# Patient Record
Sex: MALE | Race: BLACK OR AFRICAN AMERICAN | NOT HISPANIC OR LATINO
[De-identification: names, ages, dates, MRNs, and addresses within clinical notes are randomized per-mention and may not be internally consistent; named-entity substitution may affect disease eponyms.]

---

## 2017-12-13 PROBLEM — Z00.00 ENCOUNTER FOR PREVENTIVE HEALTH EXAMINATION: Status: ACTIVE | Noted: 2017-12-13

## 2018-01-10 ENCOUNTER — APPOINTMENT (OUTPATIENT)
Dept: UROLOGY | Facility: CLINIC | Age: 63
End: 2018-01-10
Payer: COMMERCIAL

## 2018-01-10 VITALS
BODY MASS INDEX: 40.98 KG/M2 | TEMPERATURE: 98 F | DIASTOLIC BLOOD PRESSURE: 90 MMHG | WEIGHT: 255 LBS | OXYGEN SATURATION: 96 % | HEART RATE: 74 BPM | HEIGHT: 66 IN | RESPIRATION RATE: 17 BRPM | SYSTOLIC BLOOD PRESSURE: 134 MMHG

## 2018-01-10 PROCEDURE — 99203 OFFICE O/P NEW LOW 30 MIN: CPT

## 2018-01-12 LAB
APPEARANCE: CLEAR
BACTERIA: NEGATIVE
BILIRUBIN URINE: NEGATIVE
BLOOD URINE: NEGATIVE
COLOR: YELLOW
GLUCOSE QUALITATIVE U: NEGATIVE MG/DL
HYALINE CASTS: 0 /LPF
KETONES URINE: NEGATIVE
LEUKOCYTE ESTERASE URINE: NEGATIVE
MICROSCOPIC-UA: NORMAL
NITRITE URINE: NEGATIVE
PH URINE: 5.5
PROTEIN URINE: 30 MG/DL
PSA SERPL-MCNC: 8.07 NG/ML
RED BLOOD CELLS URINE: 0 /HPF
SPECIFIC GRAVITY URINE: 1.02
SQUAMOUS EPITHELIAL CELLS: 0 /HPF
URINE COMMENTS: NORMAL
UROBILINOGEN URINE: NEGATIVE MG/DL
WHITE BLOOD CELLS URINE: 1 /HPF

## 2018-01-23 ENCOUNTER — MESSAGE (OUTPATIENT)
Age: 63
End: 2018-01-23

## 2018-01-26 ENCOUNTER — APPOINTMENT (OUTPATIENT)
Dept: UROLOGY | Facility: CLINIC | Age: 63
End: 2018-01-26
Payer: COMMERCIAL

## 2018-01-26 VITALS
SYSTOLIC BLOOD PRESSURE: 148 MMHG | TEMPERATURE: 98.3 F | RESPIRATION RATE: 16 BRPM | WEIGHT: 255 LBS | HEART RATE: 76 BPM | BODY MASS INDEX: 41.16 KG/M2 | DIASTOLIC BLOOD PRESSURE: 96 MMHG

## 2018-01-26 PROCEDURE — 76942 ECHO GUIDE FOR BIOPSY: CPT | Mod: 59

## 2018-01-26 PROCEDURE — 55700: CPT

## 2018-01-26 PROCEDURE — 76872 US TRANSRECTAL: CPT

## 2018-01-27 ENCOUNTER — INPATIENT (INPATIENT)
Facility: HOSPITAL | Age: 63
LOS: 3 days | Discharge: HOME HEALTH | End: 2018-01-31
Attending: UROLOGY | Admitting: UROLOGY
Payer: COMMERCIAL

## 2018-01-27 VITALS
DIASTOLIC BLOOD PRESSURE: 79 MMHG | TEMPERATURE: 99 F | RESPIRATION RATE: 20 BRPM | OXYGEN SATURATION: 97 % | SYSTOLIC BLOOD PRESSURE: 127 MMHG | HEART RATE: 141 BPM

## 2018-01-27 LAB
ALBUMIN SERPL ELPH-MCNC: 3.7 G/DL — SIGNIFICANT CHANGE UP (ref 3.3–5)
ALP SERPL-CCNC: 57 U/L — SIGNIFICANT CHANGE UP (ref 40–120)
ALT FLD-CCNC: 66 U/L — HIGH (ref 4–41)
AST SERPL-CCNC: 119 U/L — HIGH (ref 4–40)
B PERT DNA SPEC QL NAA+PROBE: SIGNIFICANT CHANGE UP
BASE EXCESS BLDV CALC-SCNC: -1.4 MMOL/L — SIGNIFICANT CHANGE UP
BASOPHILS # BLD AUTO: 0.01 K/UL — SIGNIFICANT CHANGE UP (ref 0–0.2)
BASOPHILS NFR BLD AUTO: 0.1 % — SIGNIFICANT CHANGE UP (ref 0–2)
BILIRUB SERPL-MCNC: 1 MG/DL — SIGNIFICANT CHANGE UP (ref 0.2–1.2)
BLOOD GAS VENOUS - CREATININE: 1.72 MG/DL — HIGH (ref 0.5–1.3)
BUN SERPL-MCNC: 17 MG/DL — SIGNIFICANT CHANGE UP (ref 7–23)
C PNEUM DNA SPEC QL NAA+PROBE: NOT DETECTED — SIGNIFICANT CHANGE UP
CALCIUM SERPL-MCNC: 9.3 MG/DL — SIGNIFICANT CHANGE UP (ref 8.4–10.5)
CHLORIDE BLDV-SCNC: 104 MMOL/L — SIGNIFICANT CHANGE UP (ref 96–108)
CHLORIDE SERPL-SCNC: 100 MMOL/L — SIGNIFICANT CHANGE UP (ref 98–107)
CO2 SERPL-SCNC: 20 MMOL/L — LOW (ref 22–31)
CREAT SERPL-MCNC: 1.85 MG/DL — HIGH (ref 0.5–1.3)
EOSINOPHIL # BLD AUTO: 0.02 K/UL — SIGNIFICANT CHANGE UP (ref 0–0.5)
EOSINOPHIL NFR BLD AUTO: 0.3 % — SIGNIFICANT CHANGE UP (ref 0–6)
FLUAV H1 2009 PAND RNA SPEC QL NAA+PROBE: NOT DETECTED — SIGNIFICANT CHANGE UP
FLUAV H1 RNA SPEC QL NAA+PROBE: NOT DETECTED — SIGNIFICANT CHANGE UP
FLUAV H3 RNA SPEC QL NAA+PROBE: NOT DETECTED — SIGNIFICANT CHANGE UP
FLUAV SUBTYP SPEC NAA+PROBE: SIGNIFICANT CHANGE UP
FLUBV RNA SPEC QL NAA+PROBE: NOT DETECTED — SIGNIFICANT CHANGE UP
GAS PNL BLDV: 134 MMOL/L — LOW (ref 136–146)
GLUCOSE BLDV-MCNC: 233 — HIGH (ref 70–99)
GLUCOSE SERPL-MCNC: 224 MG/DL — HIGH (ref 70–99)
HADV DNA SPEC QL NAA+PROBE: NOT DETECTED — SIGNIFICANT CHANGE UP
HCO3 BLDV-SCNC: 23 MMOL/L — SIGNIFICANT CHANGE UP (ref 20–27)
HCOV 229E RNA SPEC QL NAA+PROBE: NOT DETECTED — SIGNIFICANT CHANGE UP
HCOV HKU1 RNA SPEC QL NAA+PROBE: NOT DETECTED — SIGNIFICANT CHANGE UP
HCOV NL63 RNA SPEC QL NAA+PROBE: NOT DETECTED — SIGNIFICANT CHANGE UP
HCOV OC43 RNA SPEC QL NAA+PROBE: NOT DETECTED — SIGNIFICANT CHANGE UP
HCT VFR BLD CALC: 40.5 % — SIGNIFICANT CHANGE UP (ref 39–50)
HCT VFR BLDV CALC: 41.7 % — SIGNIFICANT CHANGE UP (ref 39–51)
HGB BLD-MCNC: 13.6 G/DL — SIGNIFICANT CHANGE UP (ref 13–17)
HGB BLDV-MCNC: 13.6 G/DL — SIGNIFICANT CHANGE UP (ref 13–17)
HMPV RNA SPEC QL NAA+PROBE: NOT DETECTED — SIGNIFICANT CHANGE UP
HPIV1 RNA SPEC QL NAA+PROBE: NOT DETECTED — SIGNIFICANT CHANGE UP
HPIV2 RNA SPEC QL NAA+PROBE: NOT DETECTED — SIGNIFICANT CHANGE UP
HPIV3 RNA SPEC QL NAA+PROBE: NOT DETECTED — SIGNIFICANT CHANGE UP
HPIV4 RNA SPEC QL NAA+PROBE: NOT DETECTED — SIGNIFICANT CHANGE UP
IMM GRANULOCYTES # BLD AUTO: 0.04 # — SIGNIFICANT CHANGE UP
IMM GRANULOCYTES NFR BLD AUTO: 0.6 % — SIGNIFICANT CHANGE UP (ref 0–1.5)
LACTATE BLDV-MCNC: 5.5 MMOL/L — CRITICAL HIGH (ref 0.5–2)
LYMPHOCYTES # BLD AUTO: 0.31 K/UL — LOW (ref 1–3.3)
LYMPHOCYTES # BLD AUTO: 4.3 % — LOW (ref 13–44)
M PNEUMO DNA SPEC QL NAA+PROBE: NOT DETECTED — SIGNIFICANT CHANGE UP
MCHC RBC-ENTMCNC: 29.7 PG — SIGNIFICANT CHANGE UP (ref 27–34)
MCHC RBC-ENTMCNC: 33.6 % — SIGNIFICANT CHANGE UP (ref 32–36)
MCV RBC AUTO: 88.4 FL — SIGNIFICANT CHANGE UP (ref 80–100)
MONOCYTES # BLD AUTO: 0.03 K/UL — SIGNIFICANT CHANGE UP (ref 0–0.9)
MONOCYTES NFR BLD AUTO: 0.4 % — LOW (ref 2–14)
NEUTROPHILS # BLD AUTO: 6.74 K/UL — SIGNIFICANT CHANGE UP (ref 1.8–7.4)
NEUTROPHILS NFR BLD AUTO: 94.3 % — HIGH (ref 43–77)
NRBC # FLD: 0 — SIGNIFICANT CHANGE UP
PCO2 BLDV: 34 MMHG — LOW (ref 41–51)
PH BLDV: 7.44 PH — HIGH (ref 7.32–7.43)
PLATELET # BLD AUTO: 152 K/UL — SIGNIFICANT CHANGE UP (ref 150–400)
PMV BLD: 11.7 FL — SIGNIFICANT CHANGE UP (ref 7–13)
PO2 BLDV: 54 MMHG — HIGH (ref 35–40)
POTASSIUM BLDV-SCNC: 4.2 MMOL/L — SIGNIFICANT CHANGE UP (ref 3.4–4.5)
POTASSIUM SERPL-MCNC: 4.1 MMOL/L — SIGNIFICANT CHANGE UP (ref 3.5–5.3)
POTASSIUM SERPL-SCNC: 4.1 MMOL/L — SIGNIFICANT CHANGE UP (ref 3.5–5.3)
PROT SERPL-MCNC: 6.8 G/DL — SIGNIFICANT CHANGE UP (ref 6–8.3)
RBC # BLD: 4.58 M/UL — SIGNIFICANT CHANGE UP (ref 4.2–5.8)
RBC # FLD: 13.1 % — SIGNIFICANT CHANGE UP (ref 10.3–14.5)
RSV RNA SPEC QL NAA+PROBE: POSITIVE — HIGH
RV+EV RNA SPEC QL NAA+PROBE: NOT DETECTED — SIGNIFICANT CHANGE UP
SAO2 % BLDV: 87.5 % — HIGH (ref 60–85)
SODIUM SERPL-SCNC: 140 MMOL/L — SIGNIFICANT CHANGE UP (ref 135–145)
WBC # BLD: 7.15 K/UL — SIGNIFICANT CHANGE UP (ref 3.8–10.5)
WBC # FLD AUTO: 7.15 K/UL — SIGNIFICANT CHANGE UP (ref 3.8–10.5)

## 2018-01-27 PROCEDURE — 71046 X-RAY EXAM CHEST 2 VIEWS: CPT | Mod: 26

## 2018-01-27 RX ORDER — ACETAMINOPHEN 500 MG
650 TABLET ORAL ONCE
Qty: 0 | Refills: 0 | Status: COMPLETED | OUTPATIENT
Start: 2018-01-27 | End: 2018-01-27

## 2018-01-27 RX ORDER — PIPERACILLIN AND TAZOBACTAM 4; .5 G/20ML; G/20ML
3.38 INJECTION, POWDER, LYOPHILIZED, FOR SOLUTION INTRAVENOUS ONCE
Qty: 0 | Refills: 0 | Status: COMPLETED | OUTPATIENT
Start: 2018-01-27 | End: 2018-01-27

## 2018-01-27 RX ORDER — SODIUM CHLORIDE 9 MG/ML
1000 INJECTION INTRAMUSCULAR; INTRAVENOUS; SUBCUTANEOUS ONCE
Qty: 0 | Refills: 0 | Status: COMPLETED | OUTPATIENT
Start: 2018-01-27 | End: 2018-01-27

## 2018-01-27 RX ADMIN — PIPERACILLIN AND TAZOBACTAM 200 GRAM(S): 4; .5 INJECTION, POWDER, LYOPHILIZED, FOR SOLUTION INTRAVENOUS at 21:36

## 2018-01-27 RX ADMIN — SODIUM CHLORIDE 1000 MILLILITER(S): 9 INJECTION INTRAMUSCULAR; INTRAVENOUS; SUBCUTANEOUS at 21:32

## 2018-01-27 RX ADMIN — Medication 650 MILLIGRAM(S): at 22:22

## 2018-01-27 RX ADMIN — SODIUM CHLORIDE 1000 MILLILITER(S): 9 INJECTION INTRAMUSCULAR; INTRAVENOUS; SUBCUTANEOUS at 23:24

## 2018-01-27 NOTE — ED PROVIDER NOTE - OBJECTIVE STATEMENT
This patient is a 62y male w PMHx HTN, DM, GERD, p/w weakness, chills, dysuria, and hematuria s/p prostate biopsy which was performed yesterday. This bx was done due to high PSA levels, first taken due to chronic worsening back pain. The pt denies urinary retention or incontinence. He states that since the bx he began experiencing increased frequency of urination and dysuria, and since this morning he has felt weak and had the sensation of chills and nausea. Denies vomiting or diarrhea, denies abdominal or suprapubic pain. No CP or SOB.

## 2018-01-27 NOTE — ED ADULT NURSE NOTE - OBJECTIVE STATEMENT
alert oriented x 4 no distress  c/o fever maliase  hematuria  weakness and back pain   temp 102.5 rectally  seen by Dr Varela

## 2018-01-27 NOTE — ED ADULT TRIAGE NOTE - CHIEF COMPLAINT QUOTE
S/P prostate Bx. done yest. at MD office coming with fever this .0 F + Nausea + Back pain and hematuria started this PM.

## 2018-01-27 NOTE — ED PROVIDER NOTE - CARE PLAN
Principal Discharge DX:	Prostatitis, unspecified prostatitis type  Secondary Diagnosis:	UTI (urinary tract infection)

## 2018-01-27 NOTE — ED PROVIDER NOTE - ATTENDING CONTRIBUTION TO CARE
62m, s/p prostate bx yesterday. today, began to have dysuria, f/c, hematuria. has had a cough but no other uri or viral symptoms. no rash, neck pain. exam, febrile, tachy, other vs wnl, nad. hs and lungs normal, abdo benign, no flank tenderness. likely post bx prostatitis. will do labs, iv abx, uro consult.

## 2018-01-28 DIAGNOSIS — N41.9 INFLAMMATORY DISEASE OF PROSTATE, UNSPECIFIED: ICD-10-CM

## 2018-01-28 LAB
APPEARANCE UR: SIGNIFICANT CHANGE UP
BASE EXCESS BLDV CALC-SCNC: -3.7 MMOL/L — SIGNIFICANT CHANGE UP
BASOPHILS NFR SPEC: 0 % — SIGNIFICANT CHANGE UP (ref 0–2)
BILIRUB UR-MCNC: SIGNIFICANT CHANGE UP
BLOOD GAS VENOUS - CREATININE: 1.66 MG/DL — HIGH (ref 0.5–1.3)
BLOOD UR QL VISUAL: HIGH
BUN SERPL-MCNC: 22 MG/DL — SIGNIFICANT CHANGE UP (ref 7–23)
BUN SERPL-MCNC: 24 MG/DL — HIGH (ref 7–23)
CALCIUM SERPL-MCNC: 8.3 MG/DL — LOW (ref 8.4–10.5)
CALCIUM SERPL-MCNC: 8.7 MG/DL — SIGNIFICANT CHANGE UP (ref 8.4–10.5)
CHLORIDE BLDV-SCNC: 108 MMOL/L — SIGNIFICANT CHANGE UP (ref 96–108)
CHLORIDE SERPL-SCNC: 102 MMOL/L — SIGNIFICANT CHANGE UP (ref 98–107)
CHLORIDE SERPL-SCNC: 102 MMOL/L — SIGNIFICANT CHANGE UP (ref 98–107)
CO2 SERPL-SCNC: 23 MMOL/L — SIGNIFICANT CHANGE UP (ref 22–31)
CO2 SERPL-SCNC: 24 MMOL/L — SIGNIFICANT CHANGE UP (ref 22–31)
COLOR SPEC: HIGH
CREAT SERPL-MCNC: 1.59 MG/DL — HIGH (ref 0.5–1.3)
CREAT SERPL-MCNC: 1.65 MG/DL — HIGH (ref 0.5–1.3)
EOSINOPHIL NFR FLD: 0 % — SIGNIFICANT CHANGE UP (ref 0–6)
GAS PNL BLDV: 138 MMOL/L — SIGNIFICANT CHANGE UP (ref 136–146)
GLUCOSE BLDC GLUCOMTR-MCNC: 101 MG/DL — HIGH (ref 70–99)
GLUCOSE BLDC GLUCOMTR-MCNC: 111 MG/DL — HIGH (ref 70–99)
GLUCOSE BLDC GLUCOMTR-MCNC: 125 MG/DL — HIGH (ref 70–99)
GLUCOSE BLDC GLUCOMTR-MCNC: 132 MG/DL — HIGH (ref 70–99)
GLUCOSE BLDV-MCNC: 152 — HIGH (ref 70–99)
GLUCOSE SERPL-MCNC: 116 MG/DL — HIGH (ref 70–99)
GLUCOSE SERPL-MCNC: 155 MG/DL — HIGH (ref 70–99)
GLUCOSE UR-MCNC: 50 — HIGH
HCO3 BLDV-SCNC: 20 MMOL/L — SIGNIFICANT CHANGE UP (ref 20–27)
HCT VFR BLD CALC: 31.7 % — LOW (ref 39–50)
HCT VFR BLD CALC: 37 % — LOW (ref 39–50)
HCT VFR BLDV CALC: 37.7 % — LOW (ref 39–51)
HGB BLD-MCNC: 10.5 G/DL — LOW (ref 13–17)
HGB BLD-MCNC: 12.4 G/DL — LOW (ref 13–17)
HGB BLDV-MCNC: 12.3 G/DL — LOW (ref 13–17)
KETONES UR-MCNC: SIGNIFICANT CHANGE UP
LACTATE BLDV-MCNC: 4.5 MMOL/L — CRITICAL HIGH (ref 0.5–2)
LACTATE SERPL-SCNC: 2.7 MMOL/L — HIGH (ref 0.5–2)
LACTATE SERPL-SCNC: 3.8 MMOL/L — HIGH (ref 0.5–2)
LACTATE SERPL-SCNC: 4.4 MMOL/L — CRITICAL HIGH (ref 0.5–2)
LEUKOCYTE ESTERASE UR-ACNC: SIGNIFICANT CHANGE UP
LYMPHOCYTES NFR SPEC AUTO: 3 % — LOW (ref 13–44)
MAGNESIUM SERPL-MCNC: 1.2 MG/DL — LOW (ref 1.6–2.6)
MAGNESIUM SERPL-MCNC: 2.9 MG/DL — HIGH (ref 1.6–2.6)
MANUAL SMEAR VERIFICATION: SIGNIFICANT CHANGE UP
MCHC RBC-ENTMCNC: 30.2 PG — SIGNIFICANT CHANGE UP (ref 27–34)
MCHC RBC-ENTMCNC: 30.3 PG — SIGNIFICANT CHANGE UP (ref 27–34)
MCHC RBC-ENTMCNC: 33.1 % — SIGNIFICANT CHANGE UP (ref 32–36)
MCHC RBC-ENTMCNC: 33.5 % — SIGNIFICANT CHANGE UP (ref 32–36)
MCV RBC AUTO: 90.2 FL — SIGNIFICANT CHANGE UP (ref 80–100)
MCV RBC AUTO: 91.6 FL — SIGNIFICANT CHANGE UP (ref 80–100)
METAMYELOCYTES # FLD: 5 % — HIGH (ref 0–1)
METHOD TYPE: SIGNIFICANT CHANGE UP
MONOCYTES NFR BLD: 1 % — LOW (ref 2–9)
MORPHOLOGY BLD-IMP: NORMAL — SIGNIFICANT CHANGE UP
MUCOUS THREADS # UR AUTO: SIGNIFICANT CHANGE UP
MYELOCYTES NFR BLD: 8 % — HIGH (ref 0–0)
NEUTROPHIL AB SER-ACNC: 71 % — SIGNIFICANT CHANGE UP (ref 43–77)
NEUTS BAND # BLD: 12 % — HIGH (ref 0–6)
NITRITE UR-MCNC: NEGATIVE — SIGNIFICANT CHANGE UP
NRBC # BLD: 0 /100WBC — SIGNIFICANT CHANGE UP
NRBC # FLD: 0 — SIGNIFICANT CHANGE UP
NRBC # FLD: 0 — SIGNIFICANT CHANGE UP
ORGANISM # SPEC MICROSCOPIC CNT: SIGNIFICANT CHANGE UP
ORGANISM # SPEC MICROSCOPIC CNT: SIGNIFICANT CHANGE UP
PCO2 BLDV: 44 MMHG — SIGNIFICANT CHANGE UP (ref 41–51)
PH BLDV: 7.31 PH — LOW (ref 7.32–7.43)
PH UR: 6 — SIGNIFICANT CHANGE UP (ref 4.6–8)
PHOSPHATE SERPL-MCNC: 3.2 MG/DL — SIGNIFICANT CHANGE UP (ref 2.5–4.5)
PHOSPHATE SERPL-MCNC: 3.3 MG/DL — SIGNIFICANT CHANGE UP (ref 2.5–4.5)
PLATELET # BLD AUTO: 118 K/UL — LOW (ref 150–400)
PLATELET # BLD AUTO: 125 K/UL — LOW (ref 150–400)
PLATELET COUNT - ESTIMATE: NORMAL — SIGNIFICANT CHANGE UP
PMV BLD: 11.2 FL — SIGNIFICANT CHANGE UP (ref 7–13)
PMV BLD: 12.1 FL — SIGNIFICANT CHANGE UP (ref 7–13)
PO2 BLDV: 39 MMHG — SIGNIFICANT CHANGE UP (ref 35–40)
POTASSIUM BLDV-SCNC: 4.1 MMOL/L — SIGNIFICANT CHANGE UP (ref 3.4–4.5)
POTASSIUM SERPL-MCNC: 4.9 MMOL/L — SIGNIFICANT CHANGE UP (ref 3.5–5.3)
POTASSIUM SERPL-MCNC: 5.2 MMOL/L — SIGNIFICANT CHANGE UP (ref 3.5–5.3)
POTASSIUM SERPL-SCNC: 4.9 MMOL/L — SIGNIFICANT CHANGE UP (ref 3.5–5.3)
POTASSIUM SERPL-SCNC: 5.2 MMOL/L — SIGNIFICANT CHANGE UP (ref 3.5–5.3)
PROT UR-MCNC: 300 MG/DL — SIGNIFICANT CHANGE UP
RBC # BLD: 3.46 M/UL — LOW (ref 4.2–5.8)
RBC # BLD: 4.1 M/UL — LOW (ref 4.2–5.8)
RBC # FLD: 13.3 % — SIGNIFICANT CHANGE UP (ref 10.3–14.5)
RBC # FLD: 13.4 % — SIGNIFICANT CHANGE UP (ref 10.3–14.5)
RBC CASTS # UR COMP ASSIST: SIGNIFICANT CHANGE UP (ref 0–?)
SAO2 % BLDV: 67.6 % — SIGNIFICANT CHANGE UP (ref 60–85)
SODIUM SERPL-SCNC: 138 MMOL/L — SIGNIFICANT CHANGE UP (ref 135–145)
SODIUM SERPL-SCNC: 139 MMOL/L — SIGNIFICANT CHANGE UP (ref 135–145)
SP GR SPEC: 1.03 — SIGNIFICANT CHANGE UP (ref 1–1.04)
SPECIMEN SOURCE: SIGNIFICANT CHANGE UP
SPECIMEN SOURCE: SIGNIFICANT CHANGE UP
SQUAMOUS # UR AUTO: SIGNIFICANT CHANGE UP
UROBILINOGEN FLD QL: 2 MG/DL — HIGH
WBC # BLD: 15.06 K/UL — HIGH (ref 3.8–10.5)
WBC # BLD: 15.33 K/UL — HIGH (ref 3.8–10.5)
WBC # FLD AUTO: 15.06 K/UL — HIGH (ref 3.8–10.5)
WBC # FLD AUTO: 15.33 K/UL — HIGH (ref 3.8–10.5)
WBC CLUMPS #/AREA URNS HPF: PRESENT — HIGH (ref 0–?)
WBC UR QL: SIGNIFICANT CHANGE UP (ref 0–?)
YEAST BUDDING # UR COMP ASSIST: SIGNIFICANT CHANGE UP

## 2018-01-28 PROCEDURE — 99222 1ST HOSP IP/OBS MODERATE 55: CPT

## 2018-01-28 RX ORDER — ACETAMINOPHEN 500 MG
650 TABLET ORAL EVERY 6 HOURS
Qty: 0 | Refills: 0 | Status: DISCONTINUED | OUTPATIENT
Start: 2018-01-28 | End: 2018-01-31

## 2018-01-28 RX ORDER — CEFTRIAXONE 500 MG/1
1 INJECTION, POWDER, FOR SOLUTION INTRAMUSCULAR; INTRAVENOUS EVERY 24 HOURS
Qty: 0 | Refills: 0 | Status: DISCONTINUED | OUTPATIENT
Start: 2018-01-29 | End: 2018-01-29

## 2018-01-28 RX ORDER — INSULIN LISPRO 100/ML
VIAL (ML) SUBCUTANEOUS
Qty: 0 | Refills: 0 | Status: DISCONTINUED | OUTPATIENT
Start: 2018-01-28 | End: 2018-01-31

## 2018-01-28 RX ORDER — SODIUM CHLORIDE 9 MG/ML
1000 INJECTION, SOLUTION INTRAVENOUS
Qty: 0 | Refills: 0 | Status: DISCONTINUED | OUTPATIENT
Start: 2018-01-28 | End: 2018-01-28

## 2018-01-28 RX ORDER — MAGNESIUM SULFATE 500 MG/ML
2 VIAL (ML) INJECTION
Qty: 0 | Refills: 0 | Status: COMPLETED | OUTPATIENT
Start: 2018-01-28 | End: 2018-01-28

## 2018-01-28 RX ORDER — SODIUM CHLORIDE 9 MG/ML
1000 INJECTION INTRAMUSCULAR; INTRAVENOUS; SUBCUTANEOUS ONCE
Qty: 0 | Refills: 0 | Status: DISCONTINUED | OUTPATIENT
Start: 2018-01-28 | End: 2018-01-28

## 2018-01-28 RX ORDER — FLUCONAZOLE 150 MG/1
200 TABLET ORAL EVERY 24 HOURS
Qty: 0 | Refills: 0 | Status: DISCONTINUED | OUTPATIENT
Start: 2018-01-29 | End: 2018-01-30

## 2018-01-28 RX ORDER — FLUCONAZOLE 150 MG/1
200 TABLET ORAL ONCE
Qty: 0 | Refills: 0 | Status: COMPLETED | OUTPATIENT
Start: 2018-01-28 | End: 2018-01-28

## 2018-01-28 RX ORDER — CEFTRIAXONE 500 MG/1
1 INJECTION, POWDER, FOR SOLUTION INTRAMUSCULAR; INTRAVENOUS ONCE
Qty: 0 | Refills: 0 | Status: COMPLETED | OUTPATIENT
Start: 2018-01-28 | End: 2018-01-28

## 2018-01-28 RX ORDER — INSULIN LISPRO 100/ML
VIAL (ML) SUBCUTANEOUS AT BEDTIME
Qty: 0 | Refills: 0 | Status: DISCONTINUED | OUTPATIENT
Start: 2018-01-28 | End: 2018-01-31

## 2018-01-28 RX ORDER — HEPARIN SODIUM 5000 [USP'U]/ML
5000 INJECTION INTRAVENOUS; SUBCUTANEOUS EVERY 8 HOURS
Qty: 0 | Refills: 0 | Status: DISCONTINUED | OUTPATIENT
Start: 2018-01-28 | End: 2018-01-31

## 2018-01-28 RX ORDER — SODIUM CHLORIDE 9 MG/ML
1000 INJECTION INTRAMUSCULAR; INTRAVENOUS; SUBCUTANEOUS
Qty: 0 | Refills: 0 | Status: DISCONTINUED | OUTPATIENT
Start: 2018-01-28 | End: 2018-01-29

## 2018-01-28 RX ORDER — FAMOTIDINE 10 MG/ML
20 INJECTION INTRAVENOUS DAILY
Qty: 0 | Refills: 0 | Status: DISCONTINUED | OUTPATIENT
Start: 2018-01-28 | End: 2018-01-31

## 2018-01-28 RX ORDER — SODIUM CHLORIDE 9 MG/ML
1000 INJECTION INTRAMUSCULAR; INTRAVENOUS; SUBCUTANEOUS ONCE
Qty: 0 | Refills: 0 | Status: COMPLETED | OUTPATIENT
Start: 2018-01-28 | End: 2018-01-28

## 2018-01-28 RX ADMIN — Medication 50 GRAM(S): at 16:06

## 2018-01-28 RX ADMIN — Medication 650 MILLIGRAM(S): at 11:20

## 2018-01-28 RX ADMIN — HEPARIN SODIUM 5000 UNIT(S): 5000 INJECTION INTRAVENOUS; SUBCUTANEOUS at 15:05

## 2018-01-28 RX ADMIN — SODIUM CHLORIDE 150 MILLILITER(S): 9 INJECTION, SOLUTION INTRAVENOUS at 10:22

## 2018-01-28 RX ADMIN — HEPARIN SODIUM 5000 UNIT(S): 5000 INJECTION INTRAVENOUS; SUBCUTANEOUS at 21:55

## 2018-01-28 RX ADMIN — SODIUM CHLORIDE 150 MILLILITER(S): 9 INJECTION, SOLUTION INTRAVENOUS at 11:54

## 2018-01-28 RX ADMIN — FAMOTIDINE 20 MILLIGRAM(S): 10 INJECTION INTRAVENOUS at 11:54

## 2018-01-28 RX ADMIN — FLUCONAZOLE 100 MILLIGRAM(S): 150 TABLET ORAL at 02:20

## 2018-01-28 RX ADMIN — SODIUM CHLORIDE 1000 MILLILITER(S): 9 INJECTION INTRAMUSCULAR; INTRAVENOUS; SUBCUTANEOUS at 00:39

## 2018-01-28 RX ADMIN — Medication 50 GRAM(S): at 15:05

## 2018-01-28 RX ADMIN — Medication 30 MILLILITER(S): at 10:21

## 2018-01-28 RX ADMIN — CEFTRIAXONE 100 GRAM(S): 500 INJECTION, POWDER, FOR SOLUTION INTRAMUSCULAR; INTRAVENOUS at 05:02

## 2018-01-28 RX ADMIN — SODIUM CHLORIDE 150 MILLILITER(S): 9 INJECTION INTRAMUSCULAR; INTRAVENOUS; SUBCUTANEOUS at 15:05

## 2018-01-28 RX ADMIN — Medication 50 GRAM(S): at 17:27

## 2018-01-28 RX ADMIN — SODIUM CHLORIDE 150 MILLILITER(S): 9 INJECTION, SOLUTION INTRAVENOUS at 05:03

## 2018-01-28 RX ADMIN — Medication 650 MILLIGRAM(S): at 10:21

## 2018-01-28 RX ADMIN — Medication 30 MILLILITER(S): at 22:01

## 2018-01-28 RX ADMIN — SODIUM CHLORIDE 150 MILLILITER(S): 9 INJECTION, SOLUTION INTRAVENOUS at 07:19

## 2018-01-28 RX ADMIN — HEPARIN SODIUM 5000 UNIT(S): 5000 INJECTION INTRAVENOUS; SUBCUTANEOUS at 07:17

## 2018-01-28 NOTE — PROVIDER CONTACT NOTE (CRITICAL VALUE NOTIFICATION) - ACTION/TREATMENT ORDERED:
No action required, no intervention at this time. Will continue to assess & monitor.
No intervention ordered. Will continue to assess & monitor.
No intervention at this time. Will continue to assess & monitor.

## 2018-01-28 NOTE — H&P ADULT - NSHPPHYSICALEXAM_GEN_ALL_CORE
Physical Exam  Vital signs  T(C): 37.3 (01-28-18 @ 00:31), Max: 39.2 (01-27-18 @ 21:09)  HR: 110 (01-28-18 @ 00:31)  BP: 88/54 (01-28-18 @ 00:31)  SpO2: 96% (01-28-18 @ 00:31)    UOP: not recorded    Gen: diaphoretic, chills  [] NAD [] toxic    Pulm:  [X] no resp distress [X] no substernal retractions  	  CV:  [X] no JVD  [] RRR    GI: no SP fullness or tenderness, no CVAT b/l  [X] Soft [X] ND [X] NT    :  Glans Circumcised [X]Y  []N, []lesions:  Meatus Discharge []Y  [X] N,  Blood []Y [] N  Testes  Descended [X]Y  []N,    Tender []Y  [X]N,   Epididymis Tender  []Y [X]N,    Cremasteric []Y  []N                        	  MSK:  Edema []Y [X]N

## 2018-01-28 NOTE — H&P ADULT - ASSESSMENT
62M presents with fever s/p prostate biopsy on 1/26 with Dr. Vivas at outside hospital.  -Ceftriaxone and Diflucan (yeast on UA)   -Aggresive IV fluids   -Urine cx   -Blood cx  -Trend fever, monitor tachycardia and BP   -Home medications  -Discussed with Dr. Ramon

## 2018-01-28 NOTE — H&P ADULT - HISTORY OF PRESENT ILLNESS
HPI  62M presents with fever s/p prostate biopsy on 1/26 with Dr. Vivas at outside hospital. Patient reports taking amoxicillin pre-biopsy and received antibiotics on day of biopsy. Today he reported dysuria, weakness, nausea, chills and fever at home to 104.4. In the ED, patient was having difficulty urinating and post void residual was noted to 0 cc on bladder ultrasound bedside with collapsed bladder. Currently febrile to 102.5, hypotensive and tachycardic to 126. Lactate elevated to 5.5. Also endorses lower back pain at this time. Received one dose of Zosyn. Complaining of cough. No other URI symptoms. Respiratory viral panel positive for RSV.     PAST MEDICAL & SURGICAL HISTORY:  HTN (hypertension)  DM (diabetes mellitus)      MEDICATIONS  (STANDING):  cefTRIAXone   IVPB 1 Gram(s) IV Intermittent once      FAMILY HISTORY: none       Allergies: No Known Allergies      REVIEW OF SYSTEMS: Otherwise negative as stated in HPI

## 2018-01-28 NOTE — H&P ADULT - NSHPLABSRESULTS_GEN_ALL_CORE
LABS:                        13.6   7.15  )-----------( 152      ( 2018 21:05 )             40.5         140  |  100  |  17  ----------------------------<  224<H>  4.1   |  20<L>  |  1.85<H>    Ca    9.3      2018 21:05    TPro  6.8  /  Alb  3.7  /  TBili  1.0  /  DBili  x   /  AST  119<H>  /  ALT  66<H>  /  AlkPhos  57        Urinalysis Basic - ( 2018 00:30 )  Color: ZEN / Appearance: TURBID / S.035 / pH: 6.0  Gluc: 50 / Ketone: TRACE  / Bili: SMALL / Urobili: 2 mg/dL   Blood: LARGE / Protein: 300 mg/dL / Nitrite: NEGATIVE   Leuk Esterase: TRACE / RBC: 10-25 / WBC 10-25   Sq Epi: OCC / Non Sq Epi: x / Bacteria: x    Rapid Respiratory Viral Panel (18 @ 22:00): Resp Syncytial Virus (RapRVP): POSITIVE    Urine Cx: PENDING   Blood Cx: PENDING

## 2018-01-29 DIAGNOSIS — R97.20 ELEVATED PROSTATE SPECIFIC ANTIGEN [PSA]: ICD-10-CM

## 2018-01-29 DIAGNOSIS — R79.89 OTHER SPECIFIED ABNORMAL FINDINGS OF BLOOD CHEMISTRY: ICD-10-CM

## 2018-01-29 DIAGNOSIS — N17.9 ACUTE KIDNEY FAILURE, UNSPECIFIED: ICD-10-CM

## 2018-01-29 DIAGNOSIS — I10 ESSENTIAL (PRIMARY) HYPERTENSION: ICD-10-CM

## 2018-01-29 DIAGNOSIS — N41.9 INFLAMMATORY DISEASE OF PROSTATE, UNSPECIFIED: ICD-10-CM

## 2018-01-29 DIAGNOSIS — E11.9 TYPE 2 DIABETES MELLITUS WITHOUT COMPLICATIONS: ICD-10-CM

## 2018-01-29 DIAGNOSIS — K21.9 GASTRO-ESOPHAGEAL REFLUX DISEASE WITHOUT ESOPHAGITIS: ICD-10-CM

## 2018-01-29 LAB
ALBUMIN SERPL ELPH-MCNC: 3.4 G/DL — SIGNIFICANT CHANGE UP (ref 3.3–5)
ALBUMIN SERPL ELPH-MCNC: 3.7 G/DL — SIGNIFICANT CHANGE UP (ref 3.3–5)
ALP SERPL-CCNC: 77 U/L — SIGNIFICANT CHANGE UP (ref 40–120)
ALP SERPL-CCNC: 82 U/L — SIGNIFICANT CHANGE UP (ref 40–120)
ALT FLD-CCNC: 100 U/L — HIGH (ref 4–41)
ALT FLD-CCNC: 77 U/L — HIGH (ref 4–41)
AST SERPL-CCNC: 57 U/L — HIGH (ref 4–40)
AST SERPL-CCNC: 77 U/L — HIGH (ref 4–40)
BACTERIA UR CULT: SIGNIFICANT CHANGE UP
BILIRUB DIRECT SERPL-MCNC: 0.3 MG/DL — HIGH (ref 0.1–0.2)
BILIRUB SERPL-MCNC: 0.7 MG/DL — SIGNIFICANT CHANGE UP (ref 0.2–1.2)
BILIRUB SERPL-MCNC: 1 MG/DL — SIGNIFICANT CHANGE UP (ref 0.2–1.2)
BUN SERPL-MCNC: 17 MG/DL — SIGNIFICANT CHANGE UP (ref 7–23)
BUN SERPL-MCNC: 19 MG/DL — SIGNIFICANT CHANGE UP (ref 7–23)
CALCIUM SERPL-MCNC: 8.7 MG/DL — SIGNIFICANT CHANGE UP (ref 8.4–10.5)
CALCIUM SERPL-MCNC: 9 MG/DL — SIGNIFICANT CHANGE UP (ref 8.4–10.5)
CHLORIDE SERPL-SCNC: 103 MMOL/L — SIGNIFICANT CHANGE UP (ref 98–107)
CHLORIDE SERPL-SCNC: 105 MMOL/L — SIGNIFICANT CHANGE UP (ref 98–107)
CO2 SERPL-SCNC: 22 MMOL/L — SIGNIFICANT CHANGE UP (ref 22–31)
CO2 SERPL-SCNC: 25 MMOL/L — SIGNIFICANT CHANGE UP (ref 22–31)
CREAT SERPL-MCNC: 1.15 MG/DL — SIGNIFICANT CHANGE UP (ref 0.5–1.3)
CREAT SERPL-MCNC: 1.25 MG/DL — SIGNIFICANT CHANGE UP (ref 0.5–1.3)
GGT SERPL-CCNC: 146 U/L — HIGH (ref 8–61)
GLUCOSE BLDC GLUCOMTR-MCNC: 117 MG/DL — HIGH (ref 70–99)
GLUCOSE BLDC GLUCOMTR-MCNC: 121 MG/DL — HIGH (ref 70–99)
GLUCOSE BLDC GLUCOMTR-MCNC: 133 MG/DL — HIGH (ref 70–99)
GLUCOSE BLDC GLUCOMTR-MCNC: 149 MG/DL — HIGH (ref 70–99)
GLUCOSE SERPL-MCNC: 116 MG/DL — HIGH (ref 70–99)
GLUCOSE SERPL-MCNC: 148 MG/DL — HIGH (ref 70–99)
HBA1C BLD-MCNC: 7 % — HIGH (ref 4–5.6)
HCT VFR BLD CALC: 39.6 % — SIGNIFICANT CHANGE UP (ref 39–50)
HGB BLD-MCNC: 13.3 G/DL — SIGNIFICANT CHANGE UP (ref 13–17)
LDH SERPL L TO P-CCNC: 328 U/L — HIGH (ref 135–225)
MAGNESIUM SERPL-MCNC: 2.2 MG/DL — SIGNIFICANT CHANGE UP (ref 1.6–2.6)
MCHC RBC-ENTMCNC: 30 PG — SIGNIFICANT CHANGE UP (ref 27–34)
MCHC RBC-ENTMCNC: 33.6 % — SIGNIFICANT CHANGE UP (ref 32–36)
MCV RBC AUTO: 89.4 FL — SIGNIFICANT CHANGE UP (ref 80–100)
NRBC # FLD: 0 — SIGNIFICANT CHANGE UP
ORGANISM # SPEC MICROSCOPIC CNT: SIGNIFICANT CHANGE UP
PHOSPHATE SERPL-MCNC: 1.6 MG/DL — LOW (ref 2.5–4.5)
PLATELET # BLD AUTO: 114 K/UL — LOW (ref 150–400)
PMV BLD: 12.8 FL — SIGNIFICANT CHANGE UP (ref 7–13)
POTASSIUM SERPL-MCNC: 4.3 MMOL/L — SIGNIFICANT CHANGE UP (ref 3.5–5.3)
POTASSIUM SERPL-MCNC: 5.1 MMOL/L — SIGNIFICANT CHANGE UP (ref 3.5–5.3)
POTASSIUM SERPL-SCNC: 4.3 MMOL/L — SIGNIFICANT CHANGE UP (ref 3.5–5.3)
POTASSIUM SERPL-SCNC: 5.1 MMOL/L — SIGNIFICANT CHANGE UP (ref 3.5–5.3)
PROT SERPL-MCNC: 6.9 G/DL — SIGNIFICANT CHANGE UP (ref 6–8.3)
PROT SERPL-MCNC: 7.2 G/DL — SIGNIFICANT CHANGE UP (ref 6–8.3)
RBC # BLD: 4.43 M/UL — SIGNIFICANT CHANGE UP (ref 4.2–5.8)
RBC # FLD: 13.2 % — SIGNIFICANT CHANGE UP (ref 10.3–14.5)
SODIUM SERPL-SCNC: 140 MMOL/L — SIGNIFICANT CHANGE UP (ref 135–145)
SODIUM SERPL-SCNC: 142 MMOL/L — SIGNIFICANT CHANGE UP (ref 135–145)
SPECIMEN SOURCE: SIGNIFICANT CHANGE UP
WBC # BLD: 16.47 K/UL — HIGH (ref 3.8–10.5)
WBC # FLD AUTO: 16.47 K/UL — HIGH (ref 3.8–10.5)

## 2018-01-29 PROCEDURE — 99231 SBSQ HOSP IP/OBS SF/LOW 25: CPT

## 2018-01-29 PROCEDURE — 99223 1ST HOSP IP/OBS HIGH 75: CPT

## 2018-01-29 RX ORDER — PIOGLITAZONE HYDROCHLORIDE 15 MG/1
1 TABLET ORAL
Qty: 0 | Refills: 0 | COMMUNITY

## 2018-01-29 RX ORDER — METFORMIN HYDROCHLORIDE 850 MG/1
500 TABLET ORAL
Qty: 0 | Refills: 0 | COMMUNITY

## 2018-01-29 RX ORDER — QUINAPRIL HYDROCHLORIDE 40 MG/1
40 TABLET, FILM COATED ORAL
Qty: 0 | Refills: 0 | COMMUNITY

## 2018-01-29 RX ORDER — PIPERACILLIN AND TAZOBACTAM 4; .5 G/20ML; G/20ML
3.38 INJECTION, POWDER, LYOPHILIZED, FOR SOLUTION INTRAVENOUS EVERY 8 HOURS
Qty: 0 | Refills: 0 | Status: DISCONTINUED | OUTPATIENT
Start: 2018-01-29 | End: 2018-01-30

## 2018-01-29 RX ORDER — RANITIDINE HYDROCHLORIDE 150 MG/1
1 TABLET, FILM COATED ORAL
Qty: 0 | Refills: 0 | COMMUNITY

## 2018-01-29 RX ADMIN — HEPARIN SODIUM 5000 UNIT(S): 5000 INJECTION INTRAVENOUS; SUBCUTANEOUS at 06:42

## 2018-01-29 RX ADMIN — FLUCONAZOLE 100 MILLIGRAM(S): 150 TABLET ORAL at 02:37

## 2018-01-29 RX ADMIN — CEFTRIAXONE 100 GRAM(S): 500 INJECTION, POWDER, FOR SOLUTION INTRAMUSCULAR; INTRAVENOUS at 06:42

## 2018-01-29 RX ADMIN — HEPARIN SODIUM 5000 UNIT(S): 5000 INJECTION INTRAVENOUS; SUBCUTANEOUS at 21:51

## 2018-01-29 RX ADMIN — PIPERACILLIN AND TAZOBACTAM 25 GRAM(S): 4; .5 INJECTION, POWDER, LYOPHILIZED, FOR SOLUTION INTRAVENOUS at 21:51

## 2018-01-29 RX ADMIN — HEPARIN SODIUM 5000 UNIT(S): 5000 INJECTION INTRAVENOUS; SUBCUTANEOUS at 13:48

## 2018-01-29 RX ADMIN — FAMOTIDINE 20 MILLIGRAM(S): 10 INJECTION INTRAVENOUS at 12:34

## 2018-01-29 RX ADMIN — Medication 200 MILLIGRAM(S): at 21:51

## 2018-01-29 NOTE — CONSULT NOTE ADULT - PROBLEM SELECTOR PROBLEM 3
Type 2 diabetes mellitus without complication, without long-term current use of insulin Elevated LFTs

## 2018-01-29 NOTE — CONSULT NOTE ADULT - ASSESSMENT
62M DM, HTN, HLD, GERD, elevated PSA, s/p prostate biopsy on 1/26 with Dr. Vivas at outside hospital. Patient reports taking amoxicillin pre-biopsy and received antibiotics on day of biopsy. Day after biopsy developed dysuria, weakness, nausea, chills and fever at home to 104.4. Daughter also said he was disoriented. In ED febrile to 102.5, hypotensive and tachycardic to 126. Lactate elevated to 5.5. +RSV.  Being treated for prostatitis, found to have GNR bacteremia

## 2018-01-29 NOTE — CONSULT NOTE ADULT - PROBLEM SELECTOR RECOMMENDATION 9
sepsis present on admission - after prostate biopsy developed GNR bacteremia  - fluid resuscitated, abx, lactate decreasing  - consider broaden back to zosyn until sens back, repeat blood cx's for clearance  - also on fluconazole

## 2018-01-29 NOTE — CONSULT NOTE ADULT - SUBJECTIVE AND OBJECTIVE BOX
Patient is a 62y old  Male who presents with a chief complaint of Fever s/p prostate biopsy (2018 02:45)    HPI: 62M DM, HTN, HLD, GERD, elevated PSA, s/p prostate biopsy on  with Dr. Vivas at outside hospital. Patient reports taking amoxicillin pre-biopsy and received antibiotics on day of biopsy. Day after biopsy developed dysuria, weakness, nausea, chills and fever at home to 104.4. Daughter also said he was disoriented. In ED febrile to 102.5, hypotensive and tachycardic to 126. Lactate elevated to 5.5. +RSV.   Feeling better overall.  Mental status back to baseline.  No chest pain, SOB, nausea, vomiting, diarrhea, constipation. +dry cough and nasal congestion.     Allergies: No Known Allergies    HOME MEDICATIONS: [X ] Reviewed  quinapril 40qd  metformin 500 bid  simvastatin 20  ASA 81  ranitidine bid    MEDICATIONS  (STANDING):  cefTRIAXone   IVPB 1 Gram(s) IV Intermittent every 24 hours  famotidine    Tablet 20 milliGRAM(s) Oral daily  fluconAZOLE IVPB 200 milliGRAM(s) IV Intermittent every 24 hours  heparin  Injectable 5000 Unit(s) SubCutaneous every 8 hours  insulin lispro (HumaLOG) corrective regimen sliding scale   SubCutaneous three times a day before meals  insulin lispro (HumaLOG) corrective regimen sliding scale   SubCutaneous at bedtime    MEDICATIONS  (PRN):  acetaminophen   Tablet. 650 milliGRAM(s) Oral every 6 hours PRN Mild Pain (1 - 3) and or fever  aluminum hydroxide/magnesium hydroxide/simethicone Suspension 30 milliLiter(s) Oral every 6 hours PRN Dyspepsia  guaiFENesin    Syrup 200 milliGRAM(s) Oral every 6 hours PRN Cough    PAST MEDICAL & SURGICAL HISTORY:  HTN (hypertension)  DM (diabetes mellitus)  HLD  GERD    SOCIAL HISTORY:  , 2 children, , no tob/alcohol/drugs    FAMILY HISTORY:  mother  alzheimers; father  pancreatic cancer    REVIEW OF SYSTEMS:  CONSTITUTIONAL: per HPI  EYES: No eye pain, visual disturbances, or discharge  ENMT:  No difficulty hearing, tinnitus, vertigo; No sinus or throat pain; per HPI  NECK: No pain or stiffness  BREASTS: No pain, masses, or nipple discharge  RESPIRATORY: No cough, wheezing, chills or hemoptysis; No shortness of breath  CARDIOVASCULAR: No chest pain, palpitations, dizziness, or leg swelling  GASTROINTESTINAL: No abdominal or epigastric pain. No nausea, vomiting, or hematemesis; No diarrhea or constipation. No melena or hematochezia.  GENITOURINARY: per HPI  NEUROLOGICAL: No headaches, memory loss, loss of strength, numbness, or tremors  SKIN: No itching, burning, rashes, or lesions   LYMPH NODES: No enlarged glands  ENDOCRINE: No heat or cold intolerance; No hair loss  MUSCULOSKELETAL: No muscle or back pain  PSYCHIATRIC: No depression, anxiety, mood swings, or difficulty sleeping  HEME/LYMPH: No easy bruising, or bleeding gums  ALLERGY AND IMMUNOLOGIC: No hives or eczema    [ X ] All other ROS negative  [  ] Unable to obtain due to poor mental status    Vital Signs Last 24 Hrs  T(C): 36.7 (2018 13:45), Max: 37.4 (2018 06:40)  T(F): 98.1 (2018 13:45), Max: 99.4 (2018 06:40)  HR: 102 (2018 13:45) (89 - 102)  BP: 163/83 (2018 13:45) (100/60 - 164/80)  RR: 16 (2018 13:45) (15 - 16)  SpO2: 97% (2018 13:45) (95% - 99%)    PHYSICAL EXAM:  GENERAL: NAD, well-groomed, well-developed  HEAD:  Atraumatic, Normocephalic  EYES: EOMI, PERRLA, conjunctiva and sclera clear  ENMT: Moist mucous membranes  NECK: Supple, No JVD  RESPIRATORY: Clear to auscultation bilaterally; No rales, rhonchi, wheezing, or rubs  CARDIOVASCULAR: Regular rate and rhythm; No murmurs, rubs, or gallops  GASTROINTESTINAL: Soft, Nontender, Nondistended; Bowel sounds present  GENITOURINARY: Not examined  EXTREMITIES:  2+ Peripheral Pulses, No clubbing, cyanosis, or edema  NERVOUS SYSTEM:  Alert & Oriented X3; Moving all 4 extremities; No gross sensory deficits  HEME/LYMPH: No lymphadenopathy noted  SKIN: No rashes or lesions; Incisions C/D/I  PSYCH: calm    LABS:             13.3   16.47 )-----------( 114      ( 2018 05:12 )             39.6     140  |  103  |  19  ----------------------------<  116<H>  4.3   |  22  |  1.25    Ca    9.0      2018 05:12  Phos  3.2       Mg     2.9         TPro  6.8  /  Alb  3.7  /  TBili  1.0  /  DBili  x   /  AST  119<H>  /  ALT  66<H>  /  AlkPhos  57      Urinalysis Basic - ( 2018 00:30 )  Color: ZEN / Appearance: TURBID / S.035 / pH: 6.0  Gluc: 50 / Ketone: TRACE  / Bili: SMALL / Urobili: 2 mg/dL   Blood: LARGE / Protein: 300 mg/dL / Nitrite: NEGATIVE   Leuk Esterase: TRACE / RBC: 10-25 / WBC 10-25   Sq Epi: OCC / Non Sq Epi: x / Bacteria: x    CAPILLARY BLOOD GLUCOSE  POCT Blood Glucose.: 117 mg/dL (2018 12:25)    RADIOLOGY & ADDITIONAL STUDIES:    EKG:   Personally Reviewed:  [ ] YES     Imaging:   Personally Reviewed:  [ ] YES               Consultant(s) notes reviewed:    Care Discussed with Consultant(s)/Other Providers: urology re overall care

## 2018-01-30 DIAGNOSIS — B97.4 RESPIRATORY SYNCYTIAL VIRUS AS THE CAUSE OF DISEASES CLASSIFIED ELSEWHERE: ICD-10-CM

## 2018-01-30 LAB
-  AMIKACIN: SIGNIFICANT CHANGE UP
-  AMPICILLIN/SULBACTAM: SIGNIFICANT CHANGE UP
-  AMPICILLIN: SIGNIFICANT CHANGE UP
-  AZTREONAM: SIGNIFICANT CHANGE UP
-  CEFAZOLIN: SIGNIFICANT CHANGE UP
-  CEFEPIME: SIGNIFICANT CHANGE UP
-  CEFOXITIN: SIGNIFICANT CHANGE UP
-  CEFTAZIDIME: SIGNIFICANT CHANGE UP
-  CEFTRIAXONE: SIGNIFICANT CHANGE UP
-  CIPROFLOXACIN: SIGNIFICANT CHANGE UP
-  ERTAPENEM: SIGNIFICANT CHANGE UP
-  GENTAMICIN: SIGNIFICANT CHANGE UP
-  IMIPENEM: SIGNIFICANT CHANGE UP
-  LEVOFLOXACIN: SIGNIFICANT CHANGE UP
-  MEROPENEM: SIGNIFICANT CHANGE UP
-  PIPERACILLIN/TAZOBACTAM: SIGNIFICANT CHANGE UP
-  TIGECYCLINE: SIGNIFICANT CHANGE UP
-  TOBRAMYCIN: SIGNIFICANT CHANGE UP
-  TRIMETHOPRIM/SULFAMETHOXAZOLE: SIGNIFICANT CHANGE UP
BACTERIA BLD CULT: SIGNIFICANT CHANGE UP
BACTERIA BLD CULT: SIGNIFICANT CHANGE UP
E COLI DNA BLD POS QL NAA+NON-PROBE: SIGNIFICANT CHANGE UP
GLUCOSE BLDC GLUCOMTR-MCNC: 128 MG/DL — HIGH (ref 70–99)
GLUCOSE BLDC GLUCOMTR-MCNC: 136 MG/DL — HIGH (ref 70–99)
GLUCOSE BLDC GLUCOMTR-MCNC: 151 MG/DL — HIGH (ref 70–99)
GLUCOSE BLDC GLUCOMTR-MCNC: 152 MG/DL — HIGH (ref 70–99)
HCT VFR BLD CALC: 34.5 % — LOW (ref 39–50)
HGB BLD-MCNC: 11.5 G/DL — LOW (ref 13–17)
LACTATE SERPL-SCNC: 1.3 MMOL/L — SIGNIFICANT CHANGE UP (ref 0.5–2)
MCHC RBC-ENTMCNC: 29.3 PG — SIGNIFICANT CHANGE UP (ref 27–34)
MCHC RBC-ENTMCNC: 33.3 % — SIGNIFICANT CHANGE UP (ref 32–36)
MCV RBC AUTO: 88 FL — SIGNIFICANT CHANGE UP (ref 80–100)
METHOD TYPE: SIGNIFICANT CHANGE UP
NRBC # FLD: 0 — SIGNIFICANT CHANGE UP
PLATELET # BLD AUTO: 122 K/UL — LOW (ref 150–400)
PMV BLD: 12.9 FL — SIGNIFICANT CHANGE UP (ref 7–13)
RBC # BLD: 3.92 M/UL — LOW (ref 4.2–5.8)
RBC # FLD: 13.8 % — SIGNIFICANT CHANGE UP (ref 10.3–14.5)
SPECIMEN SOURCE: SIGNIFICANT CHANGE UP
SPECIMEN SOURCE: SIGNIFICANT CHANGE UP
WBC # BLD: 15.96 K/UL — HIGH (ref 3.8–10.5)
WBC # FLD AUTO: 15.96 K/UL — HIGH (ref 3.8–10.5)

## 2018-01-30 PROCEDURE — 99254 IP/OBS CNSLTJ NEW/EST MOD 60: CPT | Mod: GC

## 2018-01-30 PROCEDURE — 99231 SBSQ HOSP IP/OBS SF/LOW 25: CPT

## 2018-01-30 PROCEDURE — 99233 SBSQ HOSP IP/OBS HIGH 50: CPT

## 2018-01-30 RX ORDER — CEFTRIAXONE 500 MG/1
2 INJECTION, POWDER, FOR SOLUTION INTRAMUSCULAR; INTRAVENOUS EVERY 24 HOURS
Qty: 0 | Refills: 0 | Status: DISCONTINUED | OUTPATIENT
Start: 2018-01-30 | End: 2018-01-31

## 2018-01-30 RX ORDER — POLYETHYLENE GLYCOL 3350 17 G/17G
17 POWDER, FOR SOLUTION ORAL DAILY
Qty: 0 | Refills: 0 | Status: DISCONTINUED | OUTPATIENT
Start: 2018-01-30 | End: 2018-01-31

## 2018-01-30 RX ORDER — DOCUSATE SODIUM 100 MG
100 CAPSULE ORAL THREE TIMES A DAY
Qty: 0 | Refills: 0 | Status: DISCONTINUED | OUTPATIENT
Start: 2018-01-30 | End: 2018-01-31

## 2018-01-30 RX ORDER — PIPERACILLIN AND TAZOBACTAM 4; .5 G/20ML; G/20ML
3.38 INJECTION, POWDER, LYOPHILIZED, FOR SOLUTION INTRAVENOUS EVERY 8 HOURS
Qty: 0 | Refills: 0 | Status: DISCONTINUED | OUTPATIENT
Start: 2018-01-30 | End: 2018-01-30

## 2018-01-30 RX ORDER — SENNA PLUS 8.6 MG/1
2 TABLET ORAL AT BEDTIME
Qty: 0 | Refills: 0 | Status: DISCONTINUED | OUTPATIENT
Start: 2018-01-30 | End: 2018-01-31

## 2018-01-30 RX ADMIN — FAMOTIDINE 20 MILLIGRAM(S): 10 INJECTION INTRAVENOUS at 11:53

## 2018-01-30 RX ADMIN — PIPERACILLIN AND TAZOBACTAM 25 GRAM(S): 4; .5 INJECTION, POWDER, LYOPHILIZED, FOR SOLUTION INTRAVENOUS at 13:17

## 2018-01-30 RX ADMIN — HEPARIN SODIUM 5000 UNIT(S): 5000 INJECTION INTRAVENOUS; SUBCUTANEOUS at 21:33

## 2018-01-30 RX ADMIN — Medication 2: at 18:03

## 2018-01-30 RX ADMIN — PIPERACILLIN AND TAZOBACTAM 25 GRAM(S): 4; .5 INJECTION, POWDER, LYOPHILIZED, FOR SOLUTION INTRAVENOUS at 06:42

## 2018-01-30 RX ADMIN — HEPARIN SODIUM 5000 UNIT(S): 5000 INJECTION INTRAVENOUS; SUBCUTANEOUS at 06:42

## 2018-01-30 RX ADMIN — POLYETHYLENE GLYCOL 3350 17 GRAM(S): 17 POWDER, FOR SOLUTION ORAL at 18:29

## 2018-01-30 RX ADMIN — Medication 100 MILLIGRAM(S): at 21:33

## 2018-01-30 RX ADMIN — SENNA PLUS 2 TABLET(S): 8.6 TABLET ORAL at 21:32

## 2018-01-30 RX ADMIN — Medication 62.5 MILLIMOLE(S): at 04:36

## 2018-01-30 RX ADMIN — Medication 200 MILLIGRAM(S): at 11:53

## 2018-01-30 RX ADMIN — FLUCONAZOLE 100 MILLIGRAM(S): 150 TABLET ORAL at 01:53

## 2018-01-30 RX ADMIN — CEFTRIAXONE 100 GRAM(S): 500 INJECTION, POWDER, FOR SOLUTION INTRAMUSCULAR; INTRAVENOUS at 18:29

## 2018-01-30 RX ADMIN — HEPARIN SODIUM 5000 UNIT(S): 5000 INJECTION INTRAVENOUS; SUBCUTANEOUS at 13:16

## 2018-01-30 NOTE — PROGRESS NOTE ADULT - PROBLEM SELECTOR PLAN 1
f/u Cx results - sensitivities  cont zosyn / Diflucan  OOB  Continued Leukocytosis will d/w Attending need for Pelvic imaging

## 2018-01-30 NOTE — CONSULT NOTE ADULT - SUBJECTIVE AND OBJECTIVE BOX
Patient is a 62y old  Male who presents with a chief complaint of Fever s/p prostate biopsy (28 Jan 2018 02:45)    HPI  62M presents with fever s/p prostate biopsy on 1/26 with Dr. Vivas at outside hospital. Patient reports taking amoxicillin pre-biopsy and received antibiotics on day of biopsy. Today he reported dysuria, weakness, nausea, chills and fever at home to 104.4. In the ED, patient was having difficulty urinating and post void residual was noted to 0 cc on bladder ultrasound bedside with collapsed bladder. Currently febrile to 102.5, hypotensive and tachycardic to 126. Lactate elevated to 5.5. Also endorses lower back pain at this time. Received one dose of Zosyn. Complaining of cough. No other URI symptoms. Respiratory viral panel positive for RSV.     PAST MEDICAL & SURGICAL HISTORY:  HTN (hypertension)  DM (diabetes mellitus)      MEDICATIONS  (STANDING):  cefTRIAXone   IVPB 1 Gram(s) IV Intermittent once      FAMILY HISTORY: none       Allergies: No Known Allergies      Social history:  No smoking      FAMILY HISTORY:    Allergies    No Known Allergies    Intolerances        Antimicrobials:    fluconAZOLE IVPB 200 milliGRAM(s) IV Intermittent every 24 hours  piperacillin/tazobactam IVPB. 3.375 Gram(s) IV Intermittent every 8 hours        Vital Signs Last 24 Hrs  T(C): 37.2 (30 Jan 2018 05:00), Max: 37.7 (29 Jan 2018 17:40)  T(F): 98.9 (30 Jan 2018 05:00), Max: 99.9 (29 Jan 2018 17:40)  HR: 95 (30 Jan 2018 05:00) (67 - 102)  BP: 157/75 (30 Jan 2018 05:00) (125/56 - 163/83)  BP(mean): --  RR: 16 (30 Jan 2018 05:00) (16 - 17)  SpO2: 96% (30 Jan 2018 05:00) (96% - 97%)                              11.5   15.96 )-----------( 122      ( 30 Jan 2018 06:05 )             34.5         01-29    142  |  105  |  17  ----------------------------<  148<H>  5.1   |  25  |  1.15    Ca    8.7      29 Jan 2018 19:03  Phos  1.6     01-29  Mg     2.2     01-29    TPro  6.9  /  Alb  3.4  /  TBili  0.7  /  DBili  x   /  AST  57<H>  /  ALT  77<H>  /  AlkPhos  77  01-29      RECENT CULTURES:  01-29 @ 06:06  BLOOD PERIPHERAL  ngtd  NO ORGANISMS ISOLATED  NO ORGANISMS ISOLATED AT 24 HOURS    01-28 @ 00:49  URINE MIDSTREAM  NGTD      01-27 @ 22:31  BLOOD PERIPHERAL  BLOOD CULTURE PCR  Escherichia coli--Resistant to Levaquin/cipro  BLOOD CULTURE PCR  PCR      RADIOLOGY  Xray Chest 2 Views PA/Lat (01.27.18 @ 23:00) >  IMPRESSION:   Clear lungs. Patient is a 62y old  Male who presents with a chief complaint of Fever s/p prostate biopsy (28 Jan 2018 02:45)    HPI  62M presents with fever s/p prostate biopsy( for elevated PSA) on 1/26 with Dr. Vivas at outside hospital. Patient reports taking amoxicillin pre-biopsy and received antibiotics on day of biopsy. Today he reported dysuria, weakness, nausea, chills and fever at home to 104.4. In the ED, patient was having difficulty urinating and post void residual was noted to 0 cc on bladder ultrasound bedside with collapsed bladder. Currently febrile to 102.5, hypotensive and tachycardic to 126. Lactate elevated to 5.5. Also endorses lower back pain at this time. Received one dose of Zosyn. Complaining of cough. No other URI symptoms. Respiratory viral panel positive for RSV.     PAST MEDICAL & SURGICAL HISTORY:  HTN (hypertension)  DM (diabetes mellitus)      MEDICATIONS  (STANDING):  cefTRIAXone   IVPB 1 Gram(s) IV Intermittent once      FAMILY HISTORY: none       Allergies: No Known Allergies      Social history:  No smoking  Originally from [facility]      FAMILY HISTORY:  No Hx of Cancer    Allergies    No Known Allergies    Intolerances        Antimicrobials:    fluconAZOLE IVPB 200 milliGRAM(s) IV Intermittent every 24 hours  piperacillin/tazobactam IVPB. 3.375 Gram(s) IV Intermittent every 8 hours        Vital Signs Last 24 Hrs  T(C): 37.2 (30 Jan 2018 05:00), Max: 37.7 (29 Jan 2018 17:40)  T(F): 98.9 (30 Jan 2018 05:00), Max: 99.9 (29 Jan 2018 17:40)  HR: 95 (30 Jan 2018 05:00) (67 - 102)  BP: 157/75 (30 Jan 2018 05:00) (125/56 - 163/83)  BP(mean): --  RR: 16 (30 Jan 2018 05:00) (16 - 17)  SpO2: 96% (30 Jan 2018 05:00) (96% - 97%)                              11.5   15.96 )-----------( 122      ( 30 Jan 2018 06:05 )             34.5         01-29    142  |  105  |  17  ----------------------------<  148<H>  5.1   |  25  |  1.15    Ca    8.7      29 Jan 2018 19:03  Phos  1.6     01-29  Mg     2.2     01-29    TPro  6.9  /  Alb  3.4  /  TBili  0.7  /  DBili  x   /  AST  57<H>  /  ALT  77<H>  /  AlkPhos  77  01-29      RECENT CULTURES:  01-29 @ 06:06  BLOOD PERIPHERAL  ngtd  NO ORGANISMS ISOLATED  NO ORGANISMS ISOLATED AT 24 HOURS    01-28 @ 00:49  URINE MIDSTREAM  NGTD      01-27 @ 22:31  BLOOD PERIPHERAL  BLOOD CULTURE PCR  Escherichia coli--Resistant to Levaquin/cipro  BLOOD CULTURE PCR  PCR      RADIOLOGY  Xray Chest 2 Views PA/Lat (01.27.18 @ 23:00) >  IMPRESSION:   Clear lungs.      RVP--positive for RSV

## 2018-01-30 NOTE — PROGRESS NOTE ADULT - PROBLEM SELECTOR PLAN 1
sepsis present on admission, improving - after prostate biopsy developed GNR bacteremia  - fluid resuscitated, abx, lactate normalized  - Ecoli bacteremia resis quinolones --> appreciate ID input - f/u cx's to assure clearance, CTX 2g for 1 week, then PO  - also on fluconazole.

## 2018-01-30 NOTE — CONSULT NOTE ADULT - ATTENDING COMMENTS
d/w pt, daughter Elva at bedside
E coli sepsis following prostate biopsy.  improving.  RSV+    Suggest;  follow repeat blood cultures drawn yesterday to assure clearance of bacteremia                ceftriaxone 2 gm iv daily for at least 7 days followed by keflex po x 7 days.

## 2018-01-30 NOTE — CONSULT NOTE ADULT - ASSESSMENT
62M presents with  fever s/p prostate biopsy on 1/26 with Dr. Vivas at outside hospital. Found to have sepsis( fever, bandemia, positive blood culture) with E coli bacteremia in the setting of recent prostate bx. Urine cultures have been negative so far and repeat blood cultures also negative so far. Still with some leukocytosis    Recommend  --Fu blood cultures from 1/29  --Continue with Zosyn for now 62M presents with  fever s/p prostate biopsy on 1/26 with Dr. Vivas at outside hospital. Found to have sepsis( fever, bandemia, positive blood culture) with E coli bacteremia in the setting of recent prostate bx. Urine cultures have been negative so far and repeat blood cultures also negative so far. Still with some leukocytosis    Recommend  --Fu blood cultures from 1/29  --Continue with Zosyn for now  --Will plan 10-14 days of Iv abx  --Can get PICC when repeat cultures are negative at 48 hours ( 1/31/18) 62M presents with  fever s/p prostate biopsy on 1/26 with Dr. Vivas at outside hospital. Found to have sepsis( fever, bandemia, positive blood culture) with E coli bacteremia in the setting of recent prostate bx. Urine cultures have been negative so far and repeat blood cultures also negative so far. Still with some leukocytosis, has some non productive cough with RVP positive for RSV    Recommend  --Contact isolation for RSV  --Fu blood cultures from 1/29  --Continue with Zosyn for now  --Will plan 10-14 days of IV abx  --Can get PICC or midline when repeat cultures are negative at 48 hours ( 1/31/18) 62M presents with  fever s/p prostate biopsy on 1/26 with Dr. Vivas at outside hospital. Found to have sepsis( fever, bandemia, positive blood culture) with E coli bacteremia in the setting of recent prostate bx. Urine cultures have been negative so far and repeat blood cultures also negative so far. Still with some leukocytosis, has some non productive cough with RVP positive for RSV    Recommend  --Contact isolation for RSV  --Fu blood cultures from 1/29  --Switch Zosyn to Ceftriaxone 2g Iv/day  --Will switch to oral abx after at least 6-7 days of Iv abx 62M presents with  fever s/p prostate biopsy on 1/26 with Dr. Vivas at outside hospital. Found to have sepsis( fever, bandemia, positive blood culture) with E coli bacteremia in the setting of recent prostate bx. Urine cultures have been negative so far and repeat blood cultures also negative so far. Still with some leukocytosis, has some non productive cough with RVP positive for RSV    Recommend  --Contact isolation for RSV  --Fu blood cultures from 1/29  --Switch Zosyn to Ceftriaxone 2g Iv/day  --Will switch to oral abx after at least 6-7 days of Iv abx  --Dc fluconazole

## 2018-01-31 ENCOUNTER — TRANSCRIPTION ENCOUNTER (OUTPATIENT)
Age: 63
End: 2018-01-31

## 2018-01-31 VITALS
TEMPERATURE: 99 F | OXYGEN SATURATION: 98 % | DIASTOLIC BLOOD PRESSURE: 95 MMHG | SYSTOLIC BLOOD PRESSURE: 167 MMHG | RESPIRATION RATE: 20 BRPM | HEART RATE: 80 BPM

## 2018-01-31 LAB
GLUCOSE BLDC GLUCOMTR-MCNC: 128 MG/DL — HIGH (ref 70–99)
GLUCOSE BLDC GLUCOMTR-MCNC: 130 MG/DL — HIGH (ref 70–99)
GLUCOSE BLDC GLUCOMTR-MCNC: 131 MG/DL — HIGH (ref 70–99)

## 2018-01-31 PROCEDURE — 77001 FLUOROGUIDE FOR VEIN DEVICE: CPT | Mod: 26,GC

## 2018-01-31 PROCEDURE — 99232 SBSQ HOSP IP/OBS MODERATE 35: CPT

## 2018-01-31 PROCEDURE — 99233 SBSQ HOSP IP/OBS HIGH 50: CPT

## 2018-01-31 PROCEDURE — 76937 US GUIDE VASCULAR ACCESS: CPT | Mod: 26

## 2018-01-31 PROCEDURE — 99238 HOSP IP/OBS DSCHRG MGMT 30/<: CPT

## 2018-01-31 PROCEDURE — 36569 INSJ PICC 5 YR+ W/O IMAGING: CPT

## 2018-01-31 RX ORDER — CEFTRIAXONE 500 MG/1
2 INJECTION, POWDER, FOR SOLUTION INTRAMUSCULAR; INTRAVENOUS
Qty: 0 | Refills: 0 | COMMUNITY
Start: 2018-01-31

## 2018-01-31 RX ORDER — LISINOPRIL 2.5 MG/1
40 TABLET ORAL DAILY
Qty: 0 | Refills: 0 | Status: DISCONTINUED | OUTPATIENT
Start: 2018-01-31 | End: 2018-01-31

## 2018-01-31 RX ORDER — POLYETHYLENE GLYCOL 3350 17 G/17G
17 POWDER, FOR SOLUTION ORAL
Qty: 0 | Refills: 0 | COMMUNITY
Start: 2018-01-31

## 2018-01-31 RX ORDER — SENNA PLUS 8.6 MG/1
2 TABLET ORAL
Qty: 0 | Refills: 0 | COMMUNITY
Start: 2018-01-31

## 2018-01-31 RX ORDER — DOCUSATE SODIUM 100 MG
1 CAPSULE ORAL
Qty: 0 | Refills: 0 | COMMUNITY
Start: 2018-01-31

## 2018-01-31 RX ADMIN — LISINOPRIL 40 MILLIGRAM(S): 2.5 TABLET ORAL at 18:34

## 2018-01-31 RX ADMIN — Medication 200 MILLIGRAM(S): at 15:12

## 2018-01-31 RX ADMIN — Medication 100 MILLIGRAM(S): at 15:12

## 2018-01-31 RX ADMIN — HEPARIN SODIUM 5000 UNIT(S): 5000 INJECTION INTRAVENOUS; SUBCUTANEOUS at 15:11

## 2018-01-31 RX ADMIN — HEPARIN SODIUM 5000 UNIT(S): 5000 INJECTION INTRAVENOUS; SUBCUTANEOUS at 06:03

## 2018-01-31 RX ADMIN — CEFTRIAXONE 100 GRAM(S): 500 INJECTION, POWDER, FOR SOLUTION INTRAMUSCULAR; INTRAVENOUS at 17:41

## 2018-01-31 RX ADMIN — POLYETHYLENE GLYCOL 3350 17 GRAM(S): 17 POWDER, FOR SOLUTION ORAL at 12:17

## 2018-01-31 RX ADMIN — Medication 200 MILLIGRAM(S): at 06:03

## 2018-01-31 RX ADMIN — FAMOTIDINE 20 MILLIGRAM(S): 10 INJECTION INTRAVENOUS at 12:16

## 2018-01-31 RX ADMIN — Medication 100 MILLIGRAM(S): at 06:03

## 2018-01-31 NOTE — DISCHARGE NOTE ADULT - CARE PROVIDER_API CALL
Ignacio Ramon), Urology  [address] [place] [address]  Phone: [phone]  Fax: [phone]    Siria Duggan), Infectious Disease; Internal Medicine  [address] [place] [id number]  Phone: [phone]  Fax: [phone]

## 2018-01-31 NOTE — DISCHARGE NOTE ADULT - PLAN OF CARE
Recovery from Infection --Call the office if you have fever greater than 101, difficulty urinating, pain not relieved with pain medication, nausea/vomiting.  --Take your antibiotics for the full course.  --Follow Homecare directions on how to administer your antibiotics  --Call for follow up appointment with Dr. Duggan - Infectious Disease- in 1 week  [phone]  --Call for follow up with Dr. Ramon within the next 2 weeks  [phone]  --take stool softners as prescribed --Call the office if you have fever greater than 101, difficulty urinating, pain not relieved with pain medication, nausea/vomiting.  --Take your antibiotics for the full course.  --Follow [facility] directions on how to administer your antibiotics  --Call for follow up appointment with Dr. Duggan - Infectious Disease- in 1 week  [phone]  --Call for follow up with Dr. Ramon within the next 2 weeks  [phone]  --take stool softners as prescribed --Call the office if you have fever greater than 101, difficulty urinating, pain not relieved with pain medication, nausea/vomiting.  --Take your antibiotics for the full course.  --Follow [facility] directions on how to administer your antibiotics  --Call for follow up appointment with Dr. Duggan - Infectious Disease- in 2 weeks  [phone]  --Call for follow up with Dr. Ramon within the next 2 weeks  [phone]  --take stool softners as prescribed

## 2018-01-31 NOTE — DISCHARGE NOTE ADULT - PATIENT PORTAL LINK FT
“You can access the [facility] Patient Portal, offered by [facility], by registering with the following website: [email or web address]”

## 2018-01-31 NOTE — DISCHARGE NOTE ADULT - CARE PLAN
Principal Discharge DX:	Prostatitis, unspecified prostatitis type  Goal:	Recovery from Infection  Assessment and plan of treatment:	--Call the office if you have fever greater than 101, difficulty urinating, pain not relieved with pain medication, nausea/vomiting.  --Take your antibiotics for the full course.  --Follow Homecare directions on how to administer your antibiotics  --Call for follow up appointment with Dr. Duggna - Infectious Disease- in 1 week  [phone]  --Call for follow up with Dr. Ramon within the next 2 weeks  [phone]  --take stool softners as prescribed Principal Discharge DX:	Prostatitis, unspecified prostatitis type  Goal:	Recovery from Infection  Assessment and plan of treatment:	--Call the office if you have fever greater than 101, difficulty urinating, pain not relieved with pain medication, nausea/vomiting.  --Take your antibiotics for the full course.  --Follow [facility] directions on how to administer your antibiotics  --Call for follow up appointment with Dr. Duggan - Infectious Disease- in 1 week  [phone]  --Call for follow up with Dr. Ramon within the next 2 weeks  [phone]  --take stool softners as prescribed Principal Discharge DX:	Prostatitis, unspecified prostatitis type  Goal:	Recovery from Infection  Assessment and plan of treatment:	--Call the office if you have fever greater than 101, difficulty urinating, pain not relieved with pain medication, nausea/vomiting.  --Take your antibiotics for the full course.  --Follow [facility] directions on how to administer your antibiotics  --Call for follow up appointment with Dr. Duggan - Infectious Disease- in 2 weeks  [phone]  --Call for follow up with Dr. Ramon within the next 2 weeks  [phone]  --take stool softners as prescribed

## 2018-01-31 NOTE — DISCHARGE NOTE ADULT - INSTRUCTIONS
Slowly resume previous diet.  Initially avoid fatty, greasy foods and large meals Call MD for any c/o nausea, vomit, fever, pain not relieved after medicated for pain, and a return appointment.  Take over the counter stool softener to prevent constipation that can be a side effect from taking pain medication Call MD for any c/o nausea, vomit, fever, pain not relieved after medicated for pain, and a return appointment.  Cleanse your left arm around the PICC line with chlorhexedine soap.  VNS to change PICC dressing tomorrow 2/1/2017, PICC was inserted 1/31/18.   Take over the counter stool softener to prevent constipation that can be a side effect from taking pain medication

## 2018-01-31 NOTE — PROGRESS NOTE ADULT - PROBLEM SELECTOR PLAN 4
on admission likely d/t sepsis, statin held, improving
on admission likely d/t sepsis, statin held, improving

## 2018-01-31 NOTE — PROGRESS NOTE ADULT - ATTENDING COMMENTS
Patient seen and examined. UCx e.coli. sensitivities pending.  Likely discharge today with 2 week total course abx.  RSV to be addressed by hospitalist.
d/w pt, and wife at bedside
d/w pt, and wife at bedside

## 2018-01-31 NOTE — DISCHARGE NOTE ADULT - HOME CARE AGENCY
[facility] - [phone]  the above infusion provider will arrange delivery of infusion supplies to the patient's home and Infusion Nurse will see patient at home for start of care on 2/1/18.

## 2018-01-31 NOTE — PROGRESS NOTE ADULT - PROBLEM SELECTOR PROBLEM 1
Prostatitis, unspecified prostatitis type

## 2018-01-31 NOTE — DISCHARGE NOTE ADULT - MEDICATION SUMMARY - MEDICATIONS TO TAKE
I will START or STAY ON the medications listed below when I get home from the hospital:    quinapril  -- 40 milligram(s) by mouth once a day  -- Indication: For Home    pioglitazone 45 mg oral tablet  -- 1 tab(s) by mouth once a day  -- Indication: For Home    metFORMIN  -- 500 milligram(s) by mouth 2 times a day  -- Indication: For Home    glipiZIDE 10 mg oral tablet  -- 1 tab(s) by mouth 2 times a day  -- Indication: For Home    cefTRIAXone  -- 2 gram(s) intravenous once a day  -- Indication: For Antibiotics - IV from Home Care Agency until 2/10/18    guaiFENesin 100 mg/5 mL oral liquid  -- 10 milliliter(s) by mouth every 6 hours, As needed, Cough  -- Indication: For cough    raNITIdine 150 mg oral capsule  -- 1 cap(s) by mouth 2 times a day  -- Indication: For Home    senna oral tablet  -- 2 tab(s) by mouth once a day (at bedtime)  -- Indication: For constipation avoidance    polyethylene glycol 3350 oral powder for reconstitution  -- 17 gram(s) by mouth once a day  -- Indication: For constipation avoidance    docusate sodium 100 mg oral capsule  -- 1 cap(s) by mouth 3 times a day  -- Indication: For constipation avoidance

## 2018-01-31 NOTE — PROGRESS NOTE ADULT - PROBLEM SELECTOR PLAN 1
sepsis present on admission, improving - after prostate biopsy developed Ecoli bacteremia  - fluid resuscitated, abx, lactate normalized  - Ecoli bacteremia resis quinolones --> appreciate ID input - repeat cx's cleared, PICC d/c on IV CTX course  - also on fluconazole. sepsis present on admission, improving - after prostate biopsy developed Ecoli bacteremia  - fluid resuscitated, abx, lactate normalized  - Ecoli bacteremia resis quinolones --> appreciate ID input - repeat cx's cleared, PICC d/c on IV CTX course

## 2018-01-31 NOTE — DISCHARGE NOTE ADULT - ADDITIONAL INSTRUCTIONS
--Call the office if you have fever greater than 101, difficulty urinating, pain not relieved with pain medication, nausea/vomiting.  --Take your antibiotics for the full course.  --Follow Homecare directions on how to administer your antibiotics  --Call for follow up appointment with Dr. Duggan - Infectious Disease- in 1 week  [phone]  --Call for follow up with Dr. Ramon within the next 2 weeks  [phone]  --take stool softners as prescribed --Call the office if you have fever greater than 101, difficulty urinating, pain not relieved with pain medication, nausea/vomiting.  --Take your antibiotics for the full course.  --Follow [facility] directions on how to administer your antibiotics  --Call for follow up appointment with Dr. Duggan - Infectious Disease- in 1 week  [phone]  --Call for follow up with Dr. Ramon within the next 2 weeks  [phone]  --take stool softners as prescribed --Call the office if you have fever greater than 101, difficulty urinating, pain not relieved with pain medication, nausea/vomiting.  --Take your antibiotics for the full course.  --Follow [facility] directions on how to administer your antibiotics  --Call for follow up appointment with Dr. Duggan - Infectious Disease- in 2 weeks  [phone]  --Call for follow up with Dr. Ramon within the next 2 weeks  [phone]  --take stool softners as prescribed

## 2018-01-31 NOTE — PROGRESS NOTE ADULT - ASSESSMENT
E coli sepsis post prostate biopsy clinically improved.  Organism resistant to quinolones and bactrim.  RSV+    Would continue ceftriaxone through 2/10.  Orders for ceftriaxone and lab work discussed with [facility].
62M DM, HTN, HLD, GERD, elevated PSA, s/p prostate biopsy on 1/26 with Dr. Vivas at outside hospital. Patient reports taking amoxicillin pre-biopsy and received antibiotics on day of biopsy. Day after biopsy developed dysuria, weakness, nausea, chills and fever at home to 104.4. Daughter also said he was disoriented. In ED febrile to 102.5, hypotensive and tachycardic to 126. Lactate elevated to 5.5. +RSV.  Being treated for prostatitis, found to have Ecoli bacteremia
63 yo M Ecoli bacteremia s/p prostate bx 1/26 and + RSV, has remained afebrile, voiding well, for PICC today
Pt s/p prostate bx admitted with fever
Pt s/p prostate bx admitted with fever and Leukocytosis
62M DM, HTN, HLD, GERD, elevated PSA, s/p prostate biopsy on 1/26 with Dr. Vivas at outside hospital. Patient reports taking amoxicillin pre-biopsy and received antibiotics on day of biopsy. Day after biopsy developed dysuria, weakness, nausea, chills and fever at home to 104.4. Daughter also said he was disoriented. In ED febrile to 102.5, hypotensive and tachycardic to 126. Lactate elevated to 5.5. +RSV.  Being treated for prostatitis, found to have GNR bacteremia

## 2018-01-31 NOTE — DISCHARGE NOTE ADULT - HOSPITAL COURSE
Admitted on 1/28 for fevers and chills after prostate Biopsy performed on 1/26.  Pt had Blood and Urine Cx's that revealed E.Coli in his blood culture. He was treated with Zosyn, Diflucan, and Ceftriaxone.  Pt defervesced and was stable during the remainder of his admission.  ID consult done and agreed upon continued treatment with Ceftriaxone. Admitted on 1/28 for fevers and chills after prostate Biopsy performed on 1/26.  Pt had Blood and Urine Cx's that revealed E.Coli in his blood culture. He was treated with Zosyn, Diflucan, and Ceftriaxone.  Pt defervesced and was stable during the remainder of his admission.  ID consult done and agreed upon continued treatment with Ceftriaxone.  Pt had PICC line placed and will be treated via [facility] with Ceftriaxone until 2/10/18

## 2018-01-31 NOTE — PROGRESS NOTE ADULT - PROBLEM SELECTOR PLAN 1
Continue ceftriaxone x at least 7 days, then po keflex per ID  Diflucan d/c  OOB, DVT prophy  Discharge later today  Continued Leukocytosis will d/w Attending need for Pelvic imaging Continue ceftriaxone x at least 7 days, then po keflex per ID  Diflucan d/c  OOB, DVT prophy  Discharge later today with home care

## 2018-01-31 NOTE — PROGRESS NOTE ADULT - SUBJECTIVE AND OBJECTIVE BOX
Follow Up: E coli bacteremia post prostate biopsy     Inverval History/ROS: feels well.  no pain, fever, chills.  Rest of ROS neg    Allergies  No Known Allergies        ANTIMICROBIALS:  cefTRIAXone   IVPB 2 every 24 hours      OTHER MEDS:  acetaminophen   Tablet. 650 milliGRAM(s) Oral every 6 hours PRN  aluminum hydroxide/magnesium hydroxide/simethicone Suspension 30 milliLiter(s) Oral every 6 hours PRN  docusate sodium 100 milliGRAM(s) Oral three times a day  famotidine    Tablet 20 milliGRAM(s) Oral daily  guaiFENesin    Syrup 200 milliGRAM(s) Oral every 6 hours PRN  heparin  Injectable 5000 Unit(s) SubCutaneous every 8 hours  HYDROcodone/homatropine Syrup 5 milliLiter(s) Oral every 6 hours PRN  insulin lispro (HumaLOG) corrective regimen sliding scale   SubCutaneous three times a day before meals  insulin lispro (HumaLOG) corrective regimen sliding scale   SubCutaneous at bedtime  lisinopril 40 milliGRAM(s) Oral daily  polyethylene glycol 3350 17 Gram(s) Oral daily  senna 2 Tablet(s) Oral at bedtime      Vital Signs Last 24 Hrs  T(C): 37.1 (31 Jan 2018 15:22), Max: 37.6 (31 Jan 2018 06:00)  T(F): 98.7 (31 Jan 2018 15:22), Max: 99.6 (31 Jan 2018 06:00)  HR: 84 (31 Jan 2018 15:22) (78 - 94)  BP: 156/92 (31 Jan 2018 15:22) (145/89 - 161/82)  BP(mean): --  RR: 22 (31 Jan 2018 15:22) (16 - 22)  SpO2: 98% (31 Jan 2018 15:22) (95% - 98%)    PHYSICAL EXAM:  General: [x ] non-toxic  HEAD/EYES: [ ] PERRL [x ] white sclera [ ] icterus  ENT:  [ ] normal [x ] supple [ ] thrush [ ] pharyngeal exudate  Cardiovascular:   [ ] murmur [x ] normal [ ] PPM/AICD  Respiratory:  [x ] clear to ausculation bilaterally  GI:  x[ ] soft, non-tender, normal bowel sounds  :  [ ] liu [x ] no CVA tenderness   Musculoskeletal:  [ ] no synovitis  Neurologic:  [ ] non-focal exam   Skin:  [x ] no rash  Lymph: [ ] no lymphadenopathy  Psychiatric:  [ ] appropriate affect [ ] alert & oriented  Lines:  [ ] no phlebitis [ ] central line  PIC left arm                                11.5   15.96 )-----------( 122      ( 30 Jan 2018 06:05 )             34.5       01-29    142  |  105  |  17  ----------------------------<  148<H>  5.1   |  25  |  1.15    Ca    8.7      29 Jan 2018 19:03  Phos  1.6     01-29  Mg     2.2     01-29    TPro  6.9  /  Alb  3.4  /  TBili  0.7  /  DBili  x   /  AST  57<H>  /  ALT  77<H>  /  AlkPhos  77  01-29          MICROBIOLOGY:  v  BLOOD PERIPHERAL  01-29-18 --  --  --      URINE MIDSTREAM  01-28-18 --  --  --      BLOOD PERIPHERAL  01-27-18 --  --  BLOOD CULTURE PCR  Escherichia coli                RADIOLOGY:
Afeb 140/75  98  96%RA    Pt has no c/o    Abd- soft NT ND    Void 1500 cc
CC: ecoli bacteremia    SUBJECTIVE / OVERNIGHT EVENTS:  Able to sleep better after Hycodan.  No BM yet.  No pain, SOB, nausea, vomiting.    MEDICATIONS  (STANDING):  cefTRIAXone   IVPB 2 Gram(s) IV Intermittent every 24 hours  docusate sodium 100 milliGRAM(s) Oral three times a day  famotidine    Tablet 20 milliGRAM(s) Oral daily  heparin  Injectable 5000 Unit(s) SubCutaneous every 8 hours  insulin lispro (HumaLOG) corrective regimen sliding scale   SubCutaneous three times a day before meals  insulin lispro (HumaLOG) corrective regimen sliding scale   SubCutaneous at bedtime  lisinopril 40 milliGRAM(s) Oral daily  polyethylene glycol 3350 17 Gram(s) Oral daily  senna 2 Tablet(s) Oral at bedtime    MEDICATIONS  (PRN):  acetaminophen   Tablet. 650 milliGRAM(s) Oral every 6 hours PRN Mild Pain (1 - 3) and or fever  aluminum hydroxide/magnesium hydroxide/simethicone Suspension 30 milliLiter(s) Oral every 6 hours PRN Dyspepsia  guaiFENesin    Syrup 200 milliGRAM(s) Oral every 6 hours PRN Cough  HYDROcodone/homatropine Syrup 5 milliLiter(s) Oral every 6 hours PRN cough not relieved with guaifenesin    CAPILLARY BLOOD GLUCOSE  POCT Blood Glucose.: 128 mg/dL (31 Jan 2018 12:03)  POCT Blood Glucose.: 130 mg/dL (31 Jan 2018 08:37)  POCT Blood Glucose.: 151 mg/dL (30 Jan 2018 22:20)  POCT Blood Glucose.: 152 mg/dL (30 Jan 2018 17:35)    Vital Signs Last 24 Hrs  T(C): 37.1 (31 Jan 2018 15:22), Max: 37.6 (31 Jan 2018 06:00)  T(F): 98.7 (31 Jan 2018 15:22), Max: 99.6 (31 Jan 2018 06:00)  HR: 84 (31 Jan 2018 15:22) (78 - 94)  BP: 156/92 (31 Jan 2018 15:22) (145/89 - 161/82)  RR: 22 (31 Jan 2018 15:22) (16 - 22)  SpO2: 98% (31 Jan 2018 15:22) (95% - 98%)    PHYSICAL EXAM:  GENERAL: NAD, well-developed  HEAD:  Atraumatic, Normocephalic  EYES: EOMI, PERRLA, conjunctiva and sclera clear  NECK: Supple, No JVD  CHEST/LUNG: Clear to auscultation bilaterally; No wheeze  HEART: Regular rate and rhythm; No murmurs, rubs, or gallops  ABDOMEN: Soft, Nontender, Nondistended; Bowel sounds present  EXTREMITIES:  2+ Peripheral Pulses, No clubbing, cyanosis, or edema  PSYCH: AAOx3  NEUROLOGY: non-focal  SKIN: No rashes or lesions    LABS:             11.5   15.96 )-----------( 122      ( 30 Jan 2018 06:05 )             34.5     142  |  105  |  17  ----------------------------<  148<H>  5.1   |  25  |  1.15    Ca    8.7      29 Jan 2018 19:03  Phos  1.6     01-29  Mg     2.2     01-29    TPro  6.9  /  Alb  3.4  /  TBili  0.7  /  DBili  x   /  AST  57<H>  /  ALT  77<H>  /  AlkPhos  77  01-29    Culture - Blood in AM (01.29.18 @ 06:06)    Culture - Blood:   NO ORGANISMS ISOLATED  NO ORGANISMS ISOLATED AT 48 HRS.    Specimen Source: BLOOD VENOUS    Culture - Blood in AM (01.29.18 @ 06:06)  Culture - Blood (01.27.18 @ 22:31)    -  Amikacin: S <=16 DELANEY    -  Ampicillin: R >16 DELANEY    -  Ampicillin/Sulbactam: R >16/8 DELANEY    -  Aztreonam: S <=4 DELANEY    -  Cefazolin: S <=8 DELANEY    -  Cefepime: S <=4 DELANEY    -  Cefoxitin: S <=8 DELANEY    -  Ceftazidime: S <=1 DELANEY    -  Ceftriaxone: S <=1 DELANEY    -  Ciprofloxacin: R >2 DELANEY    -  Ertapenem: S <=1 DELANEY    -  Gentamicin: S <=4 DELANEY    -  Imipenem: S <=1 DELANEY    -  Levofloxacin: R >4 DELANEY    -  Meropenem: S <=1 DELANEY    -  Piperacillin/Tazobactam: S <=16 DELANEY    -  Tigecycline: S <=2 DELANEY    -  Tobramycin: S <=4 DELANEY    -  Trimethoprim/Sulfamethoxazole: R >2/38 DELANEY    Culture - Blood:   ***Blood Panel PCR results on this specimen are available  approximately 3 hours after the Gram stain result***  Gram stain, PCR, and/or culture results may not always  correspond due to difference in methodologies  ------------------------------------------------------------  This PCR assay was performed using mobile melting [employer].  The  following targets are tested for:  Enterococcus, vancomycin  resistant enterococci, Listeria monocytogenes,  coagulase  negative staphylococci, S. aureus, methicillin resistant S.  aureus, Streptococcus agalactiae (Group B), S. pneumoniae,  S. pyogenes (Group A), Acinetobacter baumannii, Enterobacter  cloacae, E. coli, Klebsiella oxytoca, K. pneumoniae, Proteus  sp., Serratia marcescens, Haemophilus influenzae, Neisseria  meningitidis, Pseudomonas aeruginosa, Candida albicans, C.  glabrata, C. krusei, C. parapsilosis, C. tropicalis and the  KPC resistance gene.    -  Escherichia coli: + DETECT DELANEY    Specimen Source: BLOOD PERIPHERAL    Organism: BLOOD CULTURE PCR    Organism: Escherichia coli    Gram Stain Blood:   ***** CRITICAL RESULT *****  PERSON CALLED / READ-BACK: RODERICK HARPER RN/Y  DATE / TIME CALLED: 01/28/18 0947  CALLED BY: CLIFTON HAWK^Gram Neg Rods  AFTER: 8 HOURS INCUBATION  BOTTLE: AEROBIC   ANAEROBIC BOTTLES    Organism Identification: BLOOD CULTURE PCR  Escherichia coli    Method Type: PCR    Method Type: MICROSCAN NEG URINE COMBO 61      Culture - Blood:   NO ORGANISMS ISOLATED  NO ORGANISMS ISOLATED AT 48 HRS.    Specimen Source: BLOOD PERIPHERAL    Culture - Urine (01.28.18 @ 00:49)    Culture - Urine:   NO GROWTH AT 24 HOURS    Specimen Source: URINE MIDSTREAM    Culture - Blood (01.27.18 @ 22:31)    Culture - Blood:   EC^Escherichia coli    Culture - Blood:   SEE PREVIOUS CULTURE: COLLECTED 01/27/18 2130  RECEIVED 01/27/18 2231 FOR DELANEY  EC^Escherichia coli    Specimen Source: BLOOD VENOUS    Gram Stain Blood:   ***** CRITICAL RESULT *****  PERSON CALLED / READ-BACK:SREEKANTH HARPER RN/Y  DATE / TIME CALLED: 01/28/18 1131  CALLED BY: CLIFTON HAWK                *******************************                * This is an appended result. *                *******************************  A prior result that was reported as final has been changed.  GNR^Gram Neg Rods  AFTER: 9 HOURS INCUBATION  BOTTLE: AEROBIC   ANAEROBIC BOTTLES    RADIOLOGY & ADDITIONAL TESTS:    Imaging Personally Reviewed:    Consultant(s) Notes Reviewed:      Care Discussed with Consultants/Other Providers: urology re overall care;  ID re abx
Overnight events:  None, remained afebrile, urinating without difficulty, no cough    Subjective:  Pt feels well    Objective:    Vital signs  T(C): , Max: 37.6 (01-31-18 @ 06:00)  HR: 87 (01-31-18 @ 06:00)  BP: 158/82 (01-31-18 @ 06:00)  SpO2: 98% (01-31-18 @ 06:00)  Wt(kg): --    Output   Void: 450      Gen: NAD  Abd: soft, nontender, no suprapubic tenderness or fullness      Labs: none today    Urine Cx:   Culture - Urine (01.28.18 @ 00:49)    Culture - Urine:   NO GROWTH AT 24 HOURS    Specimen Source: URINE MIDSTREAM    Culture - Blood in AM (01.29.18 @ 06:06)    Culture - Blood:   NO ORGANISMS ISOLATED  NO ORGANISMS ISOLATED AT 48 HRS.    Specimen Source: BLOOD VENOUS    Culture - Blood:   EC^Escherichia coli (01.27.18 @ 22:31)
Subjective  HOD #3  No complaints this AM afebrile.    Objective    Vital signs  T(F): , Max: 99.9 (01-29-18 @ 17:40)  HR: 95 (01-30-18 @ 05:00)  BP: 157/75 (01-30-18 @ 05:00)  SpO2: 96% (01-30-18 @ 05:00)  Wt(kg): --    Output     01-29 @ 07:01  -  01-30 @ 07:00  --------------------------------------------------------  IN: 0 mL / OUT: 2800 mL / NET: -2800 mL    01-30 @ 07:01  -  01-30 @ 07:49  --------------------------------------------------------  IN: 0 mL / OUT: 300 mL / NET: -300 mL    IVLock  DM Diet  Void = 1 liter    Gen awake alert NAD  Abd soft     Labs      01-30 @ 06:05    WBC 15.96 / Hct 34.5  / SCr --       01-29 @ 19:03    WBC --    / Hct --    / SCr 1.15     LFT's stable  Urine Cx: NGTD  Blood Cx: E.Coli    Imaging
CC: ecoli bacteremia    SUBJECTIVE / OVERNIGHT EVENTS:  C/o cough, no relief with Robitussin.  No BM in few days, usually goes every day.  No chest pain, SOB.    MEDICATIONS  (STANDING):  cefTRIAXone   IVPB 2 Gram(s) IV Intermittent every 24 hours  docusate sodium 100 milliGRAM(s) Oral three times a day  famotidine    Tablet 20 milliGRAM(s) Oral daily  heparin  Injectable 5000 Unit(s) SubCutaneous every 8 hours  insulin lispro (HumaLOG) corrective regimen sliding scale   SubCutaneous three times a day before meals  insulin lispro (HumaLOG) corrective regimen sliding scale   SubCutaneous at bedtime  polyethylene glycol 3350 17 Gram(s) Oral daily  senna 2 Tablet(s) Oral at bedtime    MEDICATIONS  (PRN):  acetaminophen   Tablet. 650 milliGRAM(s) Oral every 6 hours PRN Mild Pain (1 - 3) and or fever  aluminum hydroxide/magnesium hydroxide/simethicone Suspension 30 milliLiter(s) Oral every 6 hours PRN Dyspepsia  guaiFENesin    Syrup 200 milliGRAM(s) Oral every 6 hours PRN Cough  HYDROcodone/homatropine Syrup 5 milliLiter(s) Oral every 6 hours PRN cough not relieved with guaifenesin    CAPILLARY BLOOD GLUCOSE  POCT Blood Glucose.: 152 mg/dL (30 Jan 2018 17:35)  POCT Blood Glucose.: 136 mg/dL (30 Jan 2018 12:21)  POCT Blood Glucose.: 128 mg/dL (30 Jan 2018 08:32)  POCT Blood Glucose.: 149 mg/dL (29 Jan 2018 22:40)    Vital Signs Last 24 Hrs  T(C): 37.4 (30 Jan 2018 17:38), Max: 37.7 (29 Jan 2018 21:38)  T(F): 99.3 (30 Jan 2018 17:38), Max: 99.9 (29 Jan 2018 21:38)  HR: 78 (30 Jan 2018 17:38) (67 - 96)  BP: 154/88 (30 Jan 2018 17:38) (125/56 - 157/75)  RR: 17 (30 Jan 2018 17:38) (16 - 17)  SpO2: 98% (30 Jan 2018 17:38) (96% - 98%)    PHYSICAL EXAM:  GENERAL: NAD, well-developed  HEAD:  Atraumatic, Normocephalic  EYES: EOMI, PERRLA, conjunctiva and sclera clear  NECK: Supple, No JVD  CHEST/LUNG: Clear to auscultation bilaterally; No wheeze  HEART: Regular rate and rhythm; No murmurs, rubs, or gallops  ABDOMEN: Soft, Nontender, Nondistended; Bowel sounds present  EXTREMITIES:  2+ Peripheral Pulses, No clubbing, cyanosis, or edema  PSYCH: AAOx3  NEUROLOGY: non-focal  SKIN: No rashes or lesions    LABS:             11.5   15.96 )-----------( 122      ( 30 Jan 2018 06:05 )             34.5     142  |  105  |  17  ----------------------------<  148<H>  5.1   |  25  |  1.15    Ca    8.7      29 Jan 2018 19:03  Phos  1.6     01-29  Mg     2.2     01-29    TPro  6.9  /  Alb  3.4  /  TBili  0.7  /  DBili  x   /  AST  57<H>  /  ALT  77<H>  /  AlkPhos  77  01-29    RADIOLOGY & ADDITIONAL TESTS:    Imaging Personally Reviewed:    Consultant(s) Notes Reviewed:      Care Discussed with Consultants/Other Providers: urology re overall care;  ID re abx

## 2018-01-31 NOTE — CHART NOTE - NSCHARTNOTEFT_GEN_A_CORE
Patient Age: 62    Patient Gender: M     Procedure (including site / side if known): PICC line placement    Diagnosis / Indication: IV antibiotics outpatient    Interventional Radiology Attending Physician:     Ordering Attending Physician: Tristen    Pertinent Medical History: septic s/p prostate biopsy    PAST MEDICAL & SURGICAL HISTORY:  HTN (hypertension)  DM (diabetes mellitus)    Pertinent Labs:                            11.5   15.96 )-----------( 122      ( 30 Jan 2018 06:05 )             34.5       01-29    142  |  105  |  17  ----------------------------<  148<H>  5.1   |  25  |  1.15    Ca    8.7      29 Jan 2018 19:03  Phos  1.6     01-29  Mg     2.2     01-29    TPro  6.9  /  Alb  3.4  /  TBili  0.7  /  DBili  x   /  AST  57<H>  /  ALT  77<H>  /  AlkPhos  77  01-29          Patient and Family aware:   [ X ]Y   [  ]N

## 2018-01-31 NOTE — DISCHARGE NOTE ADULT - CARE PROVIDERS DIRECT ADDRESSES
[email or web address]

## 2018-02-03 LAB
BACTERIA BLD CULT: SIGNIFICANT CHANGE UP
BACTERIA BLD CULT: SIGNIFICANT CHANGE UP

## 2018-02-07 PROBLEM — I10 ESSENTIAL (PRIMARY) HYPERTENSION: Chronic | Status: ACTIVE | Noted: 2018-01-28

## 2018-02-07 PROBLEM — E11.9 TYPE 2 DIABETES MELLITUS WITHOUT COMPLICATIONS: Chronic | Status: ACTIVE | Noted: 2018-01-28

## 2018-02-07 LAB — CORE LAB BIOPSY: NORMAL

## 2018-02-09 ENCOUNTER — APPOINTMENT (OUTPATIENT)
Dept: UROLOGY | Facility: CLINIC | Age: 63
End: 2018-02-09
Payer: COMMERCIAL

## 2018-02-09 VITALS
HEART RATE: 120 BPM | SYSTOLIC BLOOD PRESSURE: 132 MMHG | HEIGHT: 66 IN | DIASTOLIC BLOOD PRESSURE: 84 MMHG | RESPIRATION RATE: 18 BRPM | TEMPERATURE: 98.2 F

## 2018-02-09 PROCEDURE — 99213 OFFICE O/P EST LOW 20 MIN: CPT

## 2018-03-07 ENCOUNTER — APPOINTMENT (OUTPATIENT)
Dept: UROLOGY | Facility: CLINIC | Age: 63
End: 2018-03-07
Payer: COMMERCIAL

## 2018-03-07 VITALS
HEART RATE: 93 BPM | BODY MASS INDEX: 40.98 KG/M2 | WEIGHT: 255 LBS | RESPIRATION RATE: 18 BRPM | HEIGHT: 66 IN | DIASTOLIC BLOOD PRESSURE: 100 MMHG | SYSTOLIC BLOOD PRESSURE: 160 MMHG | TEMPERATURE: 98.7 F | OXYGEN SATURATION: 97 %

## 2018-03-07 PROCEDURE — 99213 OFFICE O/P EST LOW 20 MIN: CPT

## 2018-03-12 ENCOUNTER — APPOINTMENT (OUTPATIENT)
Dept: INFECTIOUS DISEASE | Facility: CLINIC | Age: 63
End: 2018-03-12

## 2018-03-22 LAB — BACTERIA UR CULT: NORMAL

## 2018-03-23 RX ORDER — AMOXICILLIN AND CLAVULANATE POTASSIUM 875; 125 MG/1; MG/1
875-125 TABLET, COATED ORAL
Qty: 6 | Refills: 0 | Status: COMPLETED | COMMUNITY
Start: 2018-01-12 | End: 2018-03-23

## 2018-06-01 ENCOUNTER — APPOINTMENT (OUTPATIENT)
Dept: UROLOGY | Facility: CLINIC | Age: 63
End: 2018-06-01
Payer: COMMERCIAL

## 2018-06-01 VITALS
SYSTOLIC BLOOD PRESSURE: 150 MMHG | WEIGHT: 255 LBS | RESPIRATION RATE: 18 BRPM | HEIGHT: 66 IN | DIASTOLIC BLOOD PRESSURE: 100 MMHG | TEMPERATURE: 98.5 F | OXYGEN SATURATION: 97 % | BODY MASS INDEX: 40.98 KG/M2

## 2018-06-01 PROCEDURE — 99213 OFFICE O/P EST LOW 20 MIN: CPT

## 2018-11-16 ENCOUNTER — APPOINTMENT (OUTPATIENT)
Dept: UROLOGY | Facility: CLINIC | Age: 63
End: 2018-11-16
Payer: COMMERCIAL

## 2018-11-16 VITALS — DIASTOLIC BLOOD PRESSURE: 86 MMHG | HEART RATE: 94 BPM | OXYGEN SATURATION: 96 % | SYSTOLIC BLOOD PRESSURE: 130 MMHG

## 2018-11-16 VITALS — WEIGHT: 250 LBS | BODY MASS INDEX: 40.18 KG/M2 | HEIGHT: 66 IN

## 2018-11-16 PROCEDURE — 99213 OFFICE O/P EST LOW 20 MIN: CPT

## 2018-11-19 LAB
ANION GAP SERPL CALC-SCNC: 14 MMOL/L
BUN SERPL-MCNC: 11 MG/DL
CALCIUM SERPL-MCNC: 9.9 MG/DL
CHLORIDE SERPL-SCNC: 100 MMOL/L
CO2 SERPL-SCNC: 24 MMOL/L
CREAT SERPL-MCNC: 1.03 MG/DL
GLUCOSE SERPL-MCNC: 189 MG/DL
POTASSIUM SERPL-SCNC: 4.8 MMOL/L
SODIUM SERPL-SCNC: 138 MMOL/L

## 2018-12-03 ENCOUNTER — OUTPATIENT (OUTPATIENT)
Dept: OUTPATIENT SERVICES | Facility: HOSPITAL | Age: 63
LOS: 1 days | End: 2018-12-03
Payer: COMMERCIAL

## 2018-12-03 ENCOUNTER — APPOINTMENT (OUTPATIENT)
Dept: MRI IMAGING | Facility: IMAGING CENTER | Age: 63
End: 2018-12-03
Payer: COMMERCIAL

## 2018-12-03 DIAGNOSIS — R97.20 ELEVATED PROSTATE SPECIFIC ANTIGEN [PSA]: ICD-10-CM

## 2018-12-03 PROCEDURE — 72197 MRI PELVIS W/O & W/DYE: CPT | Mod: 26

## 2018-12-03 PROCEDURE — 72197 MRI PELVIS W/O & W/DYE: CPT

## 2018-12-03 PROCEDURE — A9585: CPT

## 2018-12-14 ENCOUNTER — APPOINTMENT (OUTPATIENT)
Dept: UROLOGY | Facility: CLINIC | Age: 63
End: 2018-12-14
Payer: COMMERCIAL

## 2018-12-14 VITALS
DIASTOLIC BLOOD PRESSURE: 88 MMHG | TEMPERATURE: 98.1 F | HEIGHT: 66 IN | BODY MASS INDEX: 40.18 KG/M2 | WEIGHT: 250 LBS | SYSTOLIC BLOOD PRESSURE: 144 MMHG

## 2018-12-14 DIAGNOSIS — R97.20 ELEVATED PROSTATE, SPECIFIC ANTIGEN [PSA]: ICD-10-CM

## 2018-12-14 PROCEDURE — 99213 OFFICE O/P EST LOW 20 MIN: CPT

## 2019-01-15 PROBLEM — R97.20 ELEVATED PSA: Status: ACTIVE | Noted: 2018-01-10

## 2019-01-15 NOTE — HISTORY OF PRESENT ILLNESS
[FreeTextEntry1] : cc elevtaed psa\par s/p prostate biopsy \par doing well\par no voiding complaints \par no fever \par path benign \par repeat psa6.5\par f/u mri pirad 2 \par \par pmd repeat psa 14 \par 	2mo ago\par (9/12/18)	7mo ago\par (5/12/18)	1yr ago\par (6/29/17)	4yr ago\par (5/10/14)	5yr ago\par (9/21/13)	5yr ago\par (12/31/12)	7yr ago\par (7/21/11)\par PSA, Total Screen, Medicare	<=4.0 ng/mL	6.5 Abnormally high	6.3 Abnormally high  CM	6.2 Abnormally high  CM	2.3 CM	1.9 CM	1.5 CM	1.2 CM

## 2020-05-01 ENCOUNTER — APPOINTMENT (OUTPATIENT)
Dept: UROLOGY | Facility: CLINIC | Age: 65
End: 2020-05-01

## 2022-09-27 NOTE — PROVIDER CONTACT NOTE (OTHER) - ASSESSMENT
Improved, still having some issues, no red flags, reviewed expectations, will defer to specialist Pt A&Ox4 with stable vital signs, afebrile, and no complaints of pain.

## 2025-02-12 NOTE — ED PROVIDER NOTE - DIAGNOSIS COUNSELING, MDM
Strong peripheral pulses conducted a detailed discussion... I had a detailed discussion with the patient and/or guardian regarding the historical points, exam findings, and any diagnostic results supporting the discharge/admit diagnosis.

## 2025-03-17 NOTE — DISCHARGE NOTE ADULT - NURSING SECTION COMPLETE
Please continue with antibiotics.  Please follow-up with your dentist for further evaluation and management.    If you develop any worsening or concerning symptoms, please seek immediate medical evaluation  
Patient/Caregiver provided printed discharge information.
